# Patient Record
Sex: MALE | Race: WHITE | NOT HISPANIC OR LATINO | Employment: FULL TIME | URBAN - METROPOLITAN AREA
[De-identification: names, ages, dates, MRNs, and addresses within clinical notes are randomized per-mention and may not be internally consistent; named-entity substitution may affect disease eponyms.]

---

## 2023-02-01 PROBLEM — J32.9 CHRONIC SINUSITIS: Status: ACTIVE | Noted: 2023-02-01

## 2023-02-01 PROBLEM — G62.9 PERIPHERAL NEUROPATHY: Status: ACTIVE | Noted: 2023-02-01

## 2023-02-01 PROBLEM — S88.111A BELOW-KNEE AMPUTATION OF RIGHT LOWER EXTREMITY (MULTI): Status: ACTIVE | Noted: 2023-02-01

## 2023-02-01 PROBLEM — I10 HYPERTENSION GOAL BP (BLOOD PRESSURE) < 130/80: Status: ACTIVE | Noted: 2023-02-01

## 2023-02-01 PROBLEM — L03.90 CELLULITIS: Status: ACTIVE | Noted: 2023-02-01

## 2023-02-01 PROBLEM — R05.9 COUGH: Status: RESOLVED | Noted: 2023-02-01 | Resolved: 2023-02-01

## 2023-02-01 PROBLEM — F17.210 CIGARETTE NICOTINE DEPENDENCE WITHOUT COMPLICATION: Status: ACTIVE | Noted: 2023-02-01

## 2023-02-01 PROBLEM — E78.5 HYPERLIPIDEMIA: Status: ACTIVE | Noted: 2023-02-01

## 2023-02-01 PROBLEM — E66.3 OVERWEIGHT: Status: ACTIVE | Noted: 2023-02-01

## 2023-02-01 PROBLEM — L91.8 SKIN TAG: Status: ACTIVE | Noted: 2023-02-01

## 2023-02-01 PROBLEM — R06.00 DYSPNEA: Status: ACTIVE | Noted: 2023-02-01

## 2023-02-01 PROBLEM — R07.9 CHEST PAIN: Status: ACTIVE | Noted: 2023-02-01

## 2023-02-01 RX ORDER — ATORVASTATIN CALCIUM 40 MG/1
TABLET, FILM COATED ORAL
COMMUNITY
End: 2023-10-31 | Stop reason: SDUPTHER

## 2023-02-01 RX ORDER — CARBAMAZEPINE 100 MG/1
TABLET, EXTENDED RELEASE ORAL
COMMUNITY
End: 2023-12-01 | Stop reason: SDUPTHER

## 2023-02-01 RX ORDER — FLUTICASONE PROPIONATE 50 MCG
1 SPRAY, SUSPENSION (ML) NASAL DAILY
COMMUNITY
End: 2023-10-31 | Stop reason: WASHOUT

## 2023-02-01 RX ORDER — AZELASTINE 1 MG/ML
1 SPRAY, METERED NASAL 2 TIMES DAILY
COMMUNITY
End: 2023-10-31 | Stop reason: WASHOUT

## 2023-02-01 RX ORDER — ASPIRIN 81 MG/1
81 TABLET ORAL DAILY
COMMUNITY

## 2023-02-01 RX ORDER — AMLODIPINE BESYLATE 10 MG/1
TABLET ORAL
COMMUNITY
End: 2023-09-18

## 2023-02-01 RX ORDER — IBUPROFEN 200 MG
TABLET ORAL
COMMUNITY
End: 2023-10-31 | Stop reason: WASHOUT

## 2023-02-01 RX ORDER — IPRATROPIUM BROMIDE 21 UG/1
2 SPRAY, METERED NASAL EVERY 12 HOURS
COMMUNITY
End: 2023-10-31 | Stop reason: WASHOUT

## 2023-02-01 RX ORDER — PANTOPRAZOLE SODIUM 40 MG/1
40 TABLET, DELAYED RELEASE ORAL
COMMUNITY

## 2023-02-01 RX ORDER — OLMESARTAN MEDOXOMIL 20 MG/1
20 TABLET ORAL DAILY
COMMUNITY
End: 2023-09-01

## 2023-04-27 ENCOUNTER — OFFICE VISIT (OUTPATIENT)
Dept: PRIMARY CARE | Facility: CLINIC | Age: 53
End: 2023-04-27
Payer: COMMERCIAL

## 2023-04-27 VITALS
HEIGHT: 70 IN | HEART RATE: 87 BPM | DIASTOLIC BLOOD PRESSURE: 71 MMHG | OXYGEN SATURATION: 100 % | TEMPERATURE: 96.5 F | WEIGHT: 232 LBS | BODY MASS INDEX: 33.21 KG/M2 | SYSTOLIC BLOOD PRESSURE: 116 MMHG

## 2023-04-27 DIAGNOSIS — E78.5 HYPERLIPIDEMIA, UNSPECIFIED HYPERLIPIDEMIA TYPE: Primary | ICD-10-CM

## 2023-04-27 DIAGNOSIS — E05.90 HYPERTHYROIDISM, SUBCLINICAL: ICD-10-CM

## 2023-04-27 DIAGNOSIS — M25.562 ACUTE PAIN OF LEFT KNEE: ICD-10-CM

## 2023-04-27 PROCEDURE — 3078F DIAST BP <80 MM HG: CPT | Performed by: STUDENT IN AN ORGANIZED HEALTH CARE EDUCATION/TRAINING PROGRAM

## 2023-04-27 PROCEDURE — 99214 OFFICE O/P EST MOD 30 MIN: CPT | Performed by: STUDENT IN AN ORGANIZED HEALTH CARE EDUCATION/TRAINING PROGRAM

## 2023-04-27 PROCEDURE — 3074F SYST BP LT 130 MM HG: CPT | Performed by: STUDENT IN AN ORGANIZED HEALTH CARE EDUCATION/TRAINING PROGRAM

## 2023-04-27 NOTE — PROGRESS NOTES
"Subjective   Patient ID: Sly Patel is a 53 y.o. male who presents for Hypertension (Fuv from 12/22).  HPI  Pt reports that his left knee hurts and localizes pain to left posterolateral aspect at attachment of biceps femoris on fibula. Describes it as an an ache as a result of compensating for his prosthesis. Pt reports that stairs make the pain worse and resting makes it better.     Pt reports that he is feeling well otherwise. Reports that he eats half a box of fruity kristofer every night for dinner and does not eat any other food throughout the day.     Denies new onset headaches, fever, chills, n/v/d, chest pain, SOB, abdominal pain, urinary symptoms, and lower extremity edema.     Review of Systems  All other systems have been reviewed and are negative.    Visit Vitals  /71   Pulse 87   Temp 35.8 °C (96.5 °F)   Ht 1.778 m (5' 10\")   Wt 105 kg (232 lb)   SpO2 100%   BMI 33.29 kg/m²   Smoking Status Every Day   BSA 2.28 m²       Objective   Physical Exam  General: Alert and oriented. Appears well-nourished and in no acute distress.  Eyes: PERRLA. EOMI.  Head/neck: Normocephalic. Supple.  Lymphatics: No cervical lymphadenopathy.  Respiratory/Thorax: Clear to auscultation bilaterally. No wheezing.   Cardiovascular: Regular rate and rhythm. No murmurs.  Gastrointestinal: Soft, nontender, nondistended. +BS   Musculoskeletal: ROM intact. No joint swelling. Normal strength   Extremities: Warm and well perfused. No peripheral edema. Right, below-knee amputation and wearing prosthesis.   Neurological: No gross neurologic deficits.   Psychological: Appropriate mood and affect.   Skin: No visible rashes or lesions.     Assessment/Plan       No red flags. Follow up in      Problem List Items Addressed This Visit          Other    Hyperlipidemia - Primary    Relevant Orders    Lipid Panel     Other Visit Diagnoses       Hyperthyroidism, subclinical        Relevant Orders    Tsh With Reflex To Free T4 If " Abnormal    Acute pain of left knee        Relevant Orders    Referral to Physical Therapy          I have personally reviewed all available pertinent labs, imaging, and consult notes with the patient.     All questions and concerns were addressed. Patient verbalizes understanding instructions and agrees with established plan of care.     Patient was seen and staffed with attending physician, Dr. Petra Light.     Krystle Tinoco, DO  Family Medicine, PGY-1

## 2023-09-01 DIAGNOSIS — I10 HYPERTENSION GOAL BP (BLOOD PRESSURE) < 130/80: Primary | ICD-10-CM

## 2023-09-01 RX ORDER — OLMESARTAN MEDOXOMIL 20 MG/1
20 TABLET ORAL DAILY
Qty: 90 TABLET | Refills: 0 | Status: SHIPPED | OUTPATIENT
Start: 2023-09-01 | End: 2023-10-31 | Stop reason: SDUPTHER

## 2023-09-15 DIAGNOSIS — E78.5 HYPERLIPIDEMIA, UNSPECIFIED HYPERLIPIDEMIA TYPE: Primary | ICD-10-CM

## 2023-09-15 DIAGNOSIS — I10 HYPERTENSION GOAL BP (BLOOD PRESSURE) < 130/80: ICD-10-CM

## 2023-09-18 RX ORDER — AMLODIPINE BESYLATE 10 MG/1
TABLET ORAL
Qty: 90 TABLET | Refills: 0 | Status: SHIPPED | OUTPATIENT
Start: 2023-09-18 | End: 2023-10-31 | Stop reason: SDUPTHER

## 2023-09-18 RX ORDER — ATORVASTATIN CALCIUM 80 MG/1
80 TABLET, FILM COATED ORAL DAILY
Qty: 90 TABLET | OUTPATIENT
Start: 2023-09-18

## 2023-10-31 ENCOUNTER — OFFICE VISIT (OUTPATIENT)
Dept: PRIMARY CARE | Facility: CLINIC | Age: 53
End: 2023-10-31
Payer: COMMERCIAL

## 2023-10-31 ENCOUNTER — APPOINTMENT (OUTPATIENT)
Dept: PRIMARY CARE | Facility: CLINIC | Age: 53
End: 2023-10-31
Payer: COMMERCIAL

## 2023-10-31 VITALS
HEART RATE: 86 BPM | HEIGHT: 71 IN | WEIGHT: 228 LBS | SYSTOLIC BLOOD PRESSURE: 130 MMHG | BODY MASS INDEX: 31.92 KG/M2 | DIASTOLIC BLOOD PRESSURE: 82 MMHG

## 2023-10-31 DIAGNOSIS — E03.8 SUBCLINICAL HYPOTHYROIDISM: ICD-10-CM

## 2023-10-31 DIAGNOSIS — R79.89 LOW TSH LEVEL: ICD-10-CM

## 2023-10-31 DIAGNOSIS — E05.90 HYPERTHYROIDISM, SUBCLINICAL: ICD-10-CM

## 2023-10-31 DIAGNOSIS — M76.31 IT BAND SYNDROME, RIGHT: ICD-10-CM

## 2023-10-31 DIAGNOSIS — Z30.09 VASECTOMY EVALUATION: ICD-10-CM

## 2023-10-31 DIAGNOSIS — M25.522 PAIN OF BOTH ELBOWS: ICD-10-CM

## 2023-10-31 DIAGNOSIS — I10 HYPERTENSION GOAL BP (BLOOD PRESSURE) < 130/80: ICD-10-CM

## 2023-10-31 DIAGNOSIS — M25.521 PAIN OF BOTH ELBOWS: ICD-10-CM

## 2023-10-31 DIAGNOSIS — R23.3 EASY BRUISING: ICD-10-CM

## 2023-10-31 DIAGNOSIS — E78.5 HYPERLIPIDEMIA, UNSPECIFIED HYPERLIPIDEMIA TYPE: Primary | ICD-10-CM

## 2023-10-31 PROBLEM — L03.90 CELLULITIS: Status: RESOLVED | Noted: 2023-02-01 | Resolved: 2023-10-31

## 2023-10-31 PROBLEM — K21.9 GASTROESOPHAGEAL REFLUX DISEASE: Status: ACTIVE | Noted: 2018-10-30

## 2023-10-31 PROBLEM — R07.9 CHEST PAIN: Status: RESOLVED | Noted: 2023-02-01 | Resolved: 2023-10-31

## 2023-10-31 PROBLEM — N52.9 IMPOTENCE OF ORGANIC ORIGIN: Status: ACTIVE | Noted: 2018-10-30

## 2023-10-31 PROBLEM — R61 HYPERHIDROSIS: Status: ACTIVE | Noted: 2018-10-30

## 2023-10-31 PROBLEM — M51.06 INTERVERTEBRAL DISC DISORDER OF LUMBAR REGION WITH MYELOPATHY: Status: ACTIVE | Noted: 2018-10-30

## 2023-10-31 PROBLEM — R06.00 DYSPNEA: Status: RESOLVED | Noted: 2023-02-01 | Resolved: 2023-10-31

## 2023-10-31 LAB
BASOPHILS # BLD AUTO: 0.05 X10*3/UL (ref 0–0.1)
BASOPHILS NFR BLD AUTO: 0.5 %
CHOLEST SERPL-MCNC: 201 MG/DL (ref 0–199)
CHOLESTEROL/HDL RATIO: 4.4
EOSINOPHIL # BLD AUTO: 0.16 X10*3/UL (ref 0–0.7)
EOSINOPHIL NFR BLD AUTO: 1.7 %
ERYTHROCYTE [DISTWIDTH] IN BLOOD BY AUTOMATED COUNT: 12.5 % (ref 11.5–14.5)
HCT VFR BLD AUTO: 42.5 % (ref 41–52)
HDLC SERPL-MCNC: 46.1 MG/DL
HGB BLD-MCNC: 14.6 G/DL (ref 13.5–17.5)
IMM GRANULOCYTES # BLD AUTO: 0.02 X10*3/UL (ref 0–0.7)
IMM GRANULOCYTES NFR BLD AUTO: 0.2 % (ref 0–0.9)
LDLC SERPL CALC-MCNC: 132 MG/DL
LYMPHOCYTES # BLD AUTO: 3.13 X10*3/UL (ref 1.2–4.8)
LYMPHOCYTES NFR BLD AUTO: 33.9 %
MCH RBC QN AUTO: 33.6 PG (ref 26–34)
MCHC RBC AUTO-ENTMCNC: 34.4 G/DL (ref 32–36)
MCV RBC AUTO: 98 FL (ref 80–100)
MONOCYTES # BLD AUTO: 0.61 X10*3/UL (ref 0.1–1)
MONOCYTES NFR BLD AUTO: 6.6 %
NEUTROPHILS # BLD AUTO: 5.26 X10*3/UL (ref 1.2–7.7)
NEUTROPHILS NFR BLD AUTO: 57.1 %
NON HDL CHOLESTEROL: 155 MG/DL (ref 0–149)
NRBC BLD-RTO: 0 /100 WBCS (ref 0–0)
PLATELET # BLD AUTO: 204 X10*3/UL (ref 150–450)
PMV BLD AUTO: 10.6 FL (ref 7.5–11.5)
RBC # BLD AUTO: 4.35 X10*6/UL (ref 4.5–5.9)
TRIGL SERPL-MCNC: 116 MG/DL (ref 0–149)
TSH SERPL-ACNC: 1.45 MIU/L (ref 0.44–3.98)
VLDL: 23 MG/DL (ref 0–40)
WBC # BLD AUTO: 9.2 X10*3/UL (ref 4.4–11.3)

## 2023-10-31 PROCEDURE — 3079F DIAST BP 80-89 MM HG: CPT | Performed by: NURSE PRACTITIONER

## 2023-10-31 PROCEDURE — 3075F SYST BP GE 130 - 139MM HG: CPT | Performed by: NURSE PRACTITIONER

## 2023-10-31 PROCEDURE — 36415 COLL VENOUS BLD VENIPUNCTURE: CPT

## 2023-10-31 PROCEDURE — 84443 ASSAY THYROID STIM HORMONE: CPT

## 2023-10-31 PROCEDURE — 85025 COMPLETE CBC W/AUTO DIFF WBC: CPT

## 2023-10-31 PROCEDURE — 83036 HEMOGLOBIN GLYCOSYLATED A1C: CPT

## 2023-10-31 PROCEDURE — 80061 LIPID PANEL: CPT

## 2023-10-31 PROCEDURE — 99214 OFFICE O/P EST MOD 30 MIN: CPT | Performed by: NURSE PRACTITIONER

## 2023-10-31 RX ORDER — OLMESARTAN MEDOXOMIL 20 MG/1
20 TABLET ORAL DAILY
Qty: 90 TABLET | Refills: 3 | Status: SHIPPED | OUTPATIENT
Start: 2023-10-31 | End: 2023-12-01 | Stop reason: SDUPTHER

## 2023-10-31 RX ORDER — ATORVASTATIN CALCIUM 40 MG/1
40 TABLET, FILM COATED ORAL DAILY
Qty: 90 TABLET | Refills: 3 | Status: SHIPPED | OUTPATIENT
Start: 2023-10-31

## 2023-10-31 RX ORDER — AMLODIPINE BESYLATE 10 MG/1
10 TABLET ORAL DAILY
Qty: 90 TABLET | Refills: 3 | Status: SHIPPED | OUTPATIENT
Start: 2023-10-31 | End: 2023-12-26 | Stop reason: SDUPTHER

## 2023-10-31 NOTE — PROGRESS NOTES
"Subjective   Patient ID: Sly Patel is a 53 y.o. male who presents for chronic care.    HPI     Reports BL elbow pain to both lateral and medial aspects that have been present for a couple years. Denies any injury or trauma, but believes its from bench pressing years ago. Has some days he has no pain. Has tried ibuprofen and worn braces without much relief.     Has noticed increased bruising over the last 6-8 months. He will have bruises but not know where they're coming from. Does not take any blood thinners or ASA.     Reports pain to his R IT band. His prostethetic Dr adjusted his prosthesis but he has not had much improvement. Squatting seems to make the pain worse.     Would like a referral to urology for vasectomy evaluation.     Hypertension/Hyperlipidemia  Current meds: amlodipine 10 mg every day, olmesartan 20 mg every day, atorvastatin 40 mg every day   Home blood pressure monitoring: none  Salt intake: does not limit  Caffeine intake:3 pots of coffee per day  Diet: unhealthy   Exercise: 5-6 days per week   Alcohol use: none  Tobacco use: 1-2 packs cigarettes per day  Illicit drug use: none    Denies vision changes, headaches, dizziness, chest pain, palpitations, or edema.    GERD  Well controlled with pantoprazole. Room temperature coffee makes it worse.     Review of Systems  ROS negative except as noted above in HPI.       Objective   /82   Pulse 86   Ht 1.803 m (5' 11\")   Wt 103 kg (228 lb)   BMI 31.80 kg/m²     Physical Exam  General: Alert and oriented, in no acute distress. Appears stated age, well-nourished, and well hydrated  HEENT:  - Head: Normocephalic and atraumatic   - Eyes: EOMI, PERRLA  - ENT: Hearing grossly intact  Heart: RRR. No murmurs, clicks, or rubs  Lungs: Unlabored breathing. CTAB with no crackles, wheezes, or rhonchi  Abdomen: Normal BS in all 4 quadrants. Soft, non-tender, non-distended, with no masses  Extremities: Warm and well perfused. No edema. Normal " peripheral pulses. R BKA  Musculoskeletal: Points to BL medial and lateral epicondyle to show where pain is. Normal ROM. No TTP of R IT band. Normal gait and station  Neurological: Alert and oriented. No gross neurological deficits  Psychological: Appropriate mood and affect  Skin: No rash, abnormal lesions, cyanosis, or erythema      Assessment/Plan   BL elbow pain  - Likely medial and lateral epicondyle  - Follow up with Dr. Garza for possible injection    Easy bruising  - Check CBCd    IT band pain  - Recommend RICE therapy  - Follow up with Dr. Garza as above    Vasectomy evaluation  - Referral placed for urology     Hypertension, goal <130/80  - Diastolic borderline, will continue current meds  - Continue amlodipine 10 mg every day, olmesartan 40 mg every day  - Monitor BP at home  - Lifestyle management  - Check HgA1c    Hyperlipidemia  - Continue atovastatin 40 mg every day, recommend restarting ASA 81 mg every day  - Check lipid panel    Nicotine dependence  - Recommend smoking cessation, not ready to quit    GERD  - Well controlled   - Continue pantoprazole 40 mg every day    Subclinical hyperthyroidism  - Check TSH     RTC in 6 months or sooner PETROS Hurt-CNP  [facility]

## 2023-11-01 LAB
EST. AVERAGE GLUCOSE BLD GHB EST-MCNC: 100 MG/DL
HBA1C MFR BLD: 5.1 %

## 2023-11-10 NOTE — RESULT ENCOUNTER NOTE
Patient notified. Counseled on dietary changes. He was out of atorvastatin for 3 months so likely why it was elevated. Will recheck lipid panel at next appt.

## 2023-11-21 ENCOUNTER — OFFICE VISIT (OUTPATIENT)
Dept: PRIMARY CARE | Facility: CLINIC | Age: 53
End: 2023-11-21
Payer: COMMERCIAL

## 2023-11-21 VITALS — BODY MASS INDEX: 32.78 KG/M2 | WEIGHT: 229 LBS | HEIGHT: 70 IN

## 2023-11-21 DIAGNOSIS — M77.11 LATERAL EPICONDYLITIS OF BOTH ELBOWS: ICD-10-CM

## 2023-11-21 DIAGNOSIS — R53.83 OTHER FATIGUE: Primary | ICD-10-CM

## 2023-11-21 DIAGNOSIS — S76.301A RIGHT HAMSTRING INJURY, INITIAL ENCOUNTER: ICD-10-CM

## 2023-11-21 DIAGNOSIS — M77.12 LATERAL EPICONDYLITIS OF BOTH ELBOWS: ICD-10-CM

## 2023-11-21 PROCEDURE — 20605 DRAIN/INJ JOINT/BURSA W/O US: CPT | Performed by: FAMILY MEDICINE

## 2023-11-21 PROCEDURE — 99214 OFFICE O/P EST MOD 30 MIN: CPT | Performed by: FAMILY MEDICINE

## 2023-11-21 RX ORDER — FOAM BANDAGE 3" X 3"
1 BANDAGE TOPICAL DAILY
COMMUNITY
Start: 2018-03-08 | End: 2024-03-04 | Stop reason: ALTCHOICE

## 2023-11-21 RX ORDER — TRIAMCINOLONE ACETONIDE 40 MG/ML
20 INJECTION, SUSPENSION INTRA-ARTICULAR; INTRAMUSCULAR
Status: COMPLETED | OUTPATIENT
Start: 2023-11-21 | End: 2023-11-21

## 2023-11-21 RX ORDER — LIDOCAINE HYDROCHLORIDE 10 MG/ML
0.5 INJECTION INFILTRATION; PERINEURAL
Status: COMPLETED | OUTPATIENT
Start: 2023-11-21 | End: 2023-11-21

## 2023-11-21 RX ADMIN — LIDOCAINE HYDROCHLORIDE 0.5 ML: 10 INJECTION INFILTRATION; PERINEURAL at 16:40

## 2023-11-21 RX ADMIN — TRIAMCINOLONE ACETONIDE 20 MG: 40 INJECTION, SUSPENSION INTRA-ARTICULAR; INTRAMUSCULAR at 16:37

## 2023-11-21 RX ADMIN — LIDOCAINE HYDROCHLORIDE 0.5 ML: 10 INJECTION INFILTRATION; PERINEURAL at 16:37

## 2023-11-21 RX ADMIN — TRIAMCINOLONE ACETONIDE 20 MG: 40 INJECTION, SUSPENSION INTRA-ARTICULAR; INTRAMUSCULAR at 16:40

## 2023-11-21 NOTE — PROGRESS NOTES
Chief Complaint:   No chief complaint on file.     History Of Present Illness:    Sly Patel is a 53 y.o. male presenting for an office visit.    Bilateral elbow pain.  He locates the pain overlying his medial epicondyle and lateral epicondyle.  He states that he has bilateral lateral epicondyles are bothersome for him today.  Different motions bother him.  He has decreased the amount of weight that he lifts when he works out.  He has seen other providers about this and is here for further evaluation.  He has tried straps and braces that are not helpful.  He is right-handed.    Right lateral knee: He does wear a prosthetic for a below the knee amputation, and states that he has had his prosthetic adjusted over time.     Fatigue: he has more fatigue and he has dec energy- we agreed to check testosterone blood work.        Review of Systems:    Negative  Constitutional: fever, chills, night sweats, unexpected weight change, changes in sleep  Eyes: loss of vision, double vision, floaters  Ear/Nose/Throat/Mouth: sore throat, hearing changes, sinus congestion  Cardiovascular: chest pain, chest heaviness, palpitations, swelling in ankles  Psychological: feelings of depression, feelings of anxiety.  Hematologic/Lymphatic: swollen glands, blood clotting problems, easy bruising.   Respiratory: shortness of breath, difficulty breathing, frequent cough, wheezing  Neurological: loss of consciousness, tremors, dizzy spells, numbness, tingling.  Gastrointestinal: abdominal pain, nausea, vomiting, indigestion, heartburn, sour taste in throat when waking up, constipation, diarrhea, bloody stools, loss of bowel control  Genitourinary: urinary incontinence, increased urinary frequency, painful urination, blood in urine  Skin: Rash, lumps or bumps, worrisome moles  Sexual: sexual health concerns      Positive  Psychological: feeling generally happy, feeling safe at home      Last Recorded Vitals:  Vitals:    11/21/23 1525  "  Weight: 104 kg (229 lb)   Height: 1.778 m (5' 10\")       Past Medical History:  He has a past medical history of Cough (02/01/2023).    Past Surgical History:  He has no past surgical history on file.      Social History:  He reports that he has been smoking cigarettes. He has never used smokeless tobacco. He reports that he does not drink alcohol and does not use drugs.    Family History:  No family history on file.     Allergies:  Penicillins    Outpatient Medications:  Current Outpatient Medications   Medication Instructions    amLODIPine (NORVASC) 10 mg, oral, Daily    aspirin 81 mg, oral, Daily    atorvastatin (LIPITOR) 40 mg, oral, Daily, as directed    carBAMazepine XR (TEGretol XR) 100 mg 12 hr tablet TAKE 1 TABLET EVERY 12 HOURS DAILY.    olmesartan (BENICAR) 20 mg, oral, Daily    pantoprazole (PROTONIX) 40 mg, oral, Daily before breakfast, Do not crush, chew, or split.     silver-hydrocolloid dressing (Aquacel AG Burn) 1.2-4 X 5 %-\" bandage 1 application., Topical, Daily       Physical Exam:  GENERAL: Well developed, well nourished, alert and cooperative, and appears to be in no acute distress.  PSYCH: mood pleasant and appropriate  HEAD: normocephalic  EYES: PERRL, EOMI. vision is grossly intact.   GAIT: Normal  MUSCULOSKELETAL:   B/l elbow: lateral epicondyle ttp, no laxity w UCL testing b/l. Good strength and ROM.  Right knee: distal lateral hamstring ttp near insertion on fibular head. IT band was nontender.      Last Labs:  CBC -  Lab Results   Component Value Date    WBC 9.2 10/31/2023    HGB 14.6 10/31/2023    HCT 42.5 10/31/2023    MCV 98 10/31/2023     10/31/2023       CMP -  Lab Results   Component Value Date    CALCIUM 9.6 01/10/2023    PROT 7.0 01/10/2023    ALBUMIN 4.4 01/10/2023    AST 22 01/10/2023    ALT 32 01/10/2023    ALKPHOS 80 01/10/2023    BILITOT 0.4 01/10/2023       LIPID PANEL -   Lab Results   Component Value Date    CHOL 201 (H) 10/31/2023    TRIG 116 10/31/2023    HDL " 46.1 10/31/2023    CHHDL 4.4 10/31/2023    LDLF 206 (H) 12/06/2022    VLDL 23 10/31/2023    NHDL 155 (H) 10/31/2023         Lab Results   Component Value Date    HGBA1C 5.1 10/31/2023       Joint Injection Medium/Arthrocentesis: R elbow on 11/21/2023 4:37 PM  Indications: pain  Details: 22 G needle, lateral approach  Medications: 20 mg triamcinolone acetonide 40 mg/mL; 0.5 mL lidocaine 10 mg/mL (1 %)  Outcome: tolerated well, no immediate complications  Procedure, treatment alternatives, risks and benefits explained, specific risks discussed. Consent was given by the patient. Immediately prior to procedure a time out was called to verify the correct patient, procedure, equipment, support staff and site/side marked as required. Patient was prepped and draped in the usual sterile fashion.       Joint Injection Medium/Arthrocentesis: L elbow on 11/21/2023 4:40 PM  Indications: pain  Details: 22 G needle, lateral approach  Medications: 20 mg triamcinolone acetonide 40 mg/mL; 0.5 mL lidocaine 10 mg/mL (1 %)  Outcome: tolerated well, no immediate complications  Procedure, treatment alternatives, risks and benefits explained, specific risks discussed. Consent was given by the patient. Immediately prior to procedure a time out was called to verify the correct patient, procedure, equipment, support staff and site/side marked as required. Patient was prepped and draped in the usual sterile fashion.                   Assessment/Plan   Problem List Items Addressed This Visit             ICD-10-CM    Lateral epicondylitis of both elbows M77.11, M77.12    Other fatigue - Primary R53.83    Right hamstring injury S76.301A     Bilateral elbow injection.  Expectant management discussed.    Right lateral leg, this seems to be hamstring related, recommend physical therapy and blood flow restriction training.    Fatigue, agreed to check testosterone level.    We will follow-up once we get the testosterone results.      Thang ERICKSON  Greg, DO

## 2023-11-29 ENCOUNTER — LAB (OUTPATIENT)
Dept: LAB | Facility: LAB | Age: 53
End: 2023-11-29
Payer: COMMERCIAL

## 2023-11-29 DIAGNOSIS — R53.83 OTHER FATIGUE: ICD-10-CM

## 2023-11-29 PROCEDURE — 84402 ASSAY OF FREE TESTOSTERONE: CPT

## 2023-11-29 PROCEDURE — 36415 COLL VENOUS BLD VENIPUNCTURE: CPT

## 2023-12-01 ENCOUNTER — TELEPHONE (OUTPATIENT)
Dept: PRIMARY CARE | Facility: CLINIC | Age: 53
End: 2023-12-01
Payer: COMMERCIAL

## 2023-12-01 DIAGNOSIS — I10 HYPERTENSION GOAL BP (BLOOD PRESSURE) < 130/80: ICD-10-CM

## 2023-12-01 DIAGNOSIS — G62.9 PERIPHERAL POLYNEUROPATHY: Primary | ICD-10-CM

## 2023-12-01 RX ORDER — CARBAMAZEPINE 100 MG/1
100 TABLET, EXTENDED RELEASE ORAL EVERY 12 HOURS
Qty: 180 TABLET | Refills: 1 | Status: SHIPPED | OUTPATIENT
Start: 2023-12-01 | End: 2024-04-25 | Stop reason: SDUPTHER

## 2023-12-01 RX ORDER — OLMESARTAN MEDOXOMIL 20 MG/1
20 TABLET ORAL DAILY
Qty: 90 TABLET | Refills: 1 | Status: SHIPPED | OUTPATIENT
Start: 2023-12-01 | End: 2024-03-04 | Stop reason: ALTCHOICE

## 2023-12-03 LAB
TESTOSTERONE FREE (CHAN): 38.6 PG/ML (ref 35–155)
TESTOSTERONE,TOTAL,LC-MS/MS: 420 NG/DL (ref 250–1100)

## 2023-12-05 NOTE — TELEPHONE ENCOUNTER
Pt returned your call    No refills needed now.    Discussed testosterone level and agreed to monitor for now.    Injections helpful, consider bracing for tennis elbow.     Follow up as needed.

## 2023-12-06 ENCOUNTER — TELEPHONE (OUTPATIENT)
Dept: PRIMARY CARE | Facility: CLINIC | Age: 53
End: 2023-12-06

## 2023-12-12 ENCOUNTER — APPOINTMENT (OUTPATIENT)
Dept: PHYSICAL THERAPY | Facility: CLINIC | Age: 53
End: 2023-12-12
Payer: COMMERCIAL

## 2023-12-26 DIAGNOSIS — I10 HYPERTENSION GOAL BP (BLOOD PRESSURE) < 130/80: ICD-10-CM

## 2023-12-26 RX ORDER — AMLODIPINE BESYLATE 10 MG/1
10 TABLET ORAL DAILY
Qty: 90 TABLET | Refills: 3 | Status: SHIPPED | OUTPATIENT
Start: 2023-12-26

## 2024-01-17 ENCOUNTER — EVALUATION (OUTPATIENT)
Dept: PHYSICAL THERAPY | Facility: CLINIC | Age: 54
End: 2024-01-17
Payer: COMMERCIAL

## 2024-01-17 DIAGNOSIS — G89.29 CHRONIC PAIN OF LEFT KNEE: Primary | ICD-10-CM

## 2024-01-17 DIAGNOSIS — S76.301A RIGHT HAMSTRING INJURY, INITIAL ENCOUNTER: ICD-10-CM

## 2024-01-17 DIAGNOSIS — M25.562 CHRONIC PAIN OF LEFT KNEE: Primary | ICD-10-CM

## 2024-01-17 PROCEDURE — 97161 PT EVAL LOW COMPLEX 20 MIN: CPT | Mod: GP | Performed by: PHYSICAL THERAPIST

## 2024-01-17 PROCEDURE — 97535 SELF CARE MNGMENT TRAINING: CPT | Mod: GP | Performed by: PHYSICAL THERAPIST
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
## 2024-01-18 ASSESSMENT — ENCOUNTER SYMPTOMS
OCCASIONAL FEELINGS OF UNSTEADINESS: 0
DEPRESSION: 0
LOSS OF SENSATION IN FEET: 0

## 2024-01-29 ENCOUNTER — TELEPHONE (OUTPATIENT)
Dept: PRIMARY CARE | Facility: CLINIC | Age: 54
End: 2024-01-29
Payer: COMMERCIAL

## 2024-01-29 NOTE — TELEPHONE ENCOUNTER
Patient called in asked to schedule new patient apt, referral from Dr. Mayda fried to schedule with availability or move up, Advise?

## 2024-01-31 ENCOUNTER — APPOINTMENT (OUTPATIENT)
Dept: PHYSICAL THERAPY | Facility: CLINIC | Age: 54
End: 2024-01-31
Payer: COMMERCIAL

## 2024-02-02 ENCOUNTER — APPOINTMENT (OUTPATIENT)
Dept: PHYSICAL THERAPY | Facility: CLINIC | Age: 54
End: 2024-02-02
Payer: COMMERCIAL

## 2024-02-05 ENCOUNTER — APPOINTMENT (OUTPATIENT)
Dept: PHYSICAL THERAPY | Facility: CLINIC | Age: 54
End: 2024-02-05
Payer: COMMERCIAL

## 2024-02-07 ENCOUNTER — APPOINTMENT (OUTPATIENT)
Dept: PHYSICAL THERAPY | Facility: CLINIC | Age: 54
End: 2024-02-07
Payer: COMMERCIAL

## 2024-02-12 ENCOUNTER — APPOINTMENT (OUTPATIENT)
Dept: PHYSICAL THERAPY | Facility: CLINIC | Age: 54
End: 2024-02-12
Payer: COMMERCIAL

## 2024-02-14 ENCOUNTER — APPOINTMENT (OUTPATIENT)
Dept: PHYSICAL THERAPY | Facility: CLINIC | Age: 54
End: 2024-02-14
Payer: COMMERCIAL

## 2024-02-19 ENCOUNTER — APPOINTMENT (OUTPATIENT)
Dept: PHYSICAL THERAPY | Facility: CLINIC | Age: 54
End: 2024-02-19
Payer: COMMERCIAL

## 2024-02-21 ENCOUNTER — APPOINTMENT (OUTPATIENT)
Dept: PHYSICAL THERAPY | Facility: CLINIC | Age: 54
End: 2024-02-21
Payer: COMMERCIAL

## 2024-03-04 ENCOUNTER — OFFICE VISIT (OUTPATIENT)
Dept: PRIMARY CARE | Facility: CLINIC | Age: 54
End: 2024-03-04
Payer: COMMERCIAL

## 2024-03-04 VITALS
HEART RATE: 93 BPM | OXYGEN SATURATION: 98 % | TEMPERATURE: 98 F | BODY MASS INDEX: 31.87 KG/M2 | RESPIRATION RATE: 16 BRPM | HEIGHT: 69 IN | DIASTOLIC BLOOD PRESSURE: 76 MMHG | WEIGHT: 215.2 LBS | SYSTOLIC BLOOD PRESSURE: 144 MMHG

## 2024-03-04 DIAGNOSIS — M54.16 LUMBAR RADICULOPATHY: Primary | ICD-10-CM

## 2024-03-04 DIAGNOSIS — Z12.11 ENCOUNTER FOR SCREENING FOR MALIGNANT NEOPLASM OF COLON: ICD-10-CM

## 2024-03-04 DIAGNOSIS — I10 HYPERTENSION, UNSPECIFIED TYPE: ICD-10-CM

## 2024-03-04 DIAGNOSIS — Z00.00 ANNUAL PHYSICAL EXAM: ICD-10-CM

## 2024-03-04 DIAGNOSIS — Z72.0 TOBACCO ABUSE: ICD-10-CM

## 2024-03-04 DIAGNOSIS — S88.111A BELOW-KNEE AMPUTATION OF RIGHT LOWER EXTREMITY (MULTI): ICD-10-CM

## 2024-03-04 DIAGNOSIS — E78.5 HYPERLIPIDEMIA, UNSPECIFIED HYPERLIPIDEMIA TYPE: ICD-10-CM

## 2024-03-04 PROCEDURE — 99214 OFFICE O/P EST MOD 30 MIN: CPT | Performed by: FAMILY MEDICINE

## 2024-03-04 PROCEDURE — 3077F SYST BP >= 140 MM HG: CPT | Performed by: FAMILY MEDICINE

## 2024-03-04 PROCEDURE — 3078F DIAST BP <80 MM HG: CPT | Performed by: FAMILY MEDICINE

## 2024-03-04 RX ORDER — OLMESARTAN MEDOXOMIL 40 MG/1
40 TABLET ORAL DAILY
Qty: 30 TABLET | Refills: 11 | Status: SHIPPED | OUTPATIENT
Start: 2024-03-04 | End: 2025-03-04

## 2024-03-04 RX ORDER — METHYLPREDNISOLONE 4 MG/1
TABLET ORAL
Qty: 21 TABLET | Refills: 0 | Status: SHIPPED | OUTPATIENT
Start: 2024-03-04 | End: 2024-03-11

## 2024-03-04 RX ORDER — CLINDAMYCIN PHOSPHATE 10 UG/ML
LOTION TOPICAL AS NEEDED
COMMUNITY
Start: 2023-12-01

## 2024-03-04 ASSESSMENT — ANXIETY QUESTIONNAIRES
4. TROUBLE RELAXING: NOT AT ALL
6. BECOMING EASILY ANNOYED OR IRRITABLE: NOT AT ALL
7. FEELING AFRAID AS IF SOMETHING AWFUL MIGHT HAPPEN: NOT AT ALL
1. FEELING NERVOUS, ANXIOUS, OR ON EDGE: NOT AT ALL
3. WORRYING TOO MUCH ABOUT DIFFERENT THINGS: NOT AT ALL
IF YOU CHECKED OFF ANY PROBLEMS ON THIS QUESTIONNAIRE, HOW DIFFICULT HAVE THESE PROBLEMS MADE IT FOR YOU TO DO YOUR WORK, TAKE CARE OF THINGS AT HOME, OR GET ALONG WITH OTHER PEOPLE: NOT DIFFICULT AT ALL
2. NOT BEING ABLE TO STOP OR CONTROL WORRYING: NOT AT ALL
GAD7 TOTAL SCORE: 0
5. BEING SO RESTLESS THAT IT IS HARD TO SIT STILL: NOT AT ALL

## 2024-03-04 ASSESSMENT — ENCOUNTER SYMPTOMS
DEPRESSION: 0
LOSS OF SENSATION IN FEET: 0
OCCASIONAL FEELINGS OF UNSTEADINESS: 0
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
## 2024-03-11 ENCOUNTER — APPOINTMENT (OUTPATIENT)
Dept: PHYSICAL THERAPY | Facility: CLINIC | Age: 54
End: 2024-03-11
Payer: COMMERCIAL

## 2024-03-13 ENCOUNTER — APPOINTMENT (OUTPATIENT)
Dept: PHYSICAL THERAPY | Facility: CLINIC | Age: 54
End: 2024-03-13
Payer: COMMERCIAL

## 2024-04-25 DIAGNOSIS — G62.9 PERIPHERAL POLYNEUROPATHY: ICD-10-CM

## 2024-04-25 RX ORDER — CARBAMAZEPINE 100 MG/1
100 TABLET, EXTENDED RELEASE ORAL EVERY 12 HOURS
Qty: 180 TABLET | Refills: 1 | Status: SHIPPED | OUTPATIENT
Start: 2024-04-25

## 2024-05-12 LAB — NONINV COLON CA DNA+OCC BLD SCRN STL QL: NEGATIVE

## 2024-05-13 ENCOUNTER — TELEPHONE (OUTPATIENT)
Dept: PRIMARY CARE | Facility: CLINIC | Age: 54
End: 2024-05-13
Payer: COMMERCIAL

## 2024-05-13 NOTE — TELEPHONE ENCOUNTER
Patient stopped in needing a prosthetic sleeve for his right leg below the knee, Advise? [employer] will be faxing over the order, Advise?

## 2024-05-14 ENCOUNTER — LAB (OUTPATIENT)
Dept: LAB | Facility: LAB | Age: 54
End: 2024-05-14
Payer: COMMERCIAL

## 2024-05-14 DIAGNOSIS — Z00.00 ANNUAL PHYSICAL EXAM: ICD-10-CM

## 2024-05-14 LAB
ALBUMIN SERPL BCP-MCNC: 4.1 G/DL (ref 3.4–5)
ALP SERPL-CCNC: 71 U/L (ref 33–120)
ALT SERPL W P-5'-P-CCNC: 248 U/L (ref 10–52)
ANION GAP SERPL CALC-SCNC: 14 MMOL/L (ref 10–20)
AST SERPL W P-5'-P-CCNC: 118 U/L (ref 9–39)
BASOPHILS # BLD AUTO: 0.06 X10*3/UL (ref 0–0.1)
BASOPHILS NFR BLD AUTO: 0.6 %
BILIRUB SERPL-MCNC: 0.7 MG/DL (ref 0–1.2)
BUN SERPL-MCNC: 20 MG/DL (ref 6–23)
CALCIUM SERPL-MCNC: 9.3 MG/DL (ref 8.6–10.6)
CHLORIDE SERPL-SCNC: 104 MMOL/L (ref 98–107)
CHOLEST SERPL-MCNC: 143 MG/DL (ref 0–199)
CHOLESTEROL/HDL RATIO: 3.1
CO2 SERPL-SCNC: 24 MMOL/L (ref 21–32)
CREAT SERPL-MCNC: 1.1 MG/DL (ref 0.5–1.3)
EGFRCR SERPLBLD CKD-EPI 2021: 80 ML/MIN/1.73M*2
EOSINOPHIL # BLD AUTO: 0.12 X10*3/UL (ref 0–0.7)
EOSINOPHIL NFR BLD AUTO: 1.1 %
ERYTHROCYTE [DISTWIDTH] IN BLOOD BY AUTOMATED COUNT: 12.2 % (ref 11.5–14.5)
EST. AVERAGE GLUCOSE BLD GHB EST-MCNC: 100 MG/DL
GLUCOSE SERPL-MCNC: 107 MG/DL (ref 74–99)
HBA1C MFR BLD: 5.1 %
HCT VFR BLD AUTO: 40.3 % (ref 41–52)
HDLC SERPL-MCNC: 46 MG/DL
HGB BLD-MCNC: 13.8 G/DL (ref 13.5–17.5)
IMM GRANULOCYTES # BLD AUTO: 0.03 X10*3/UL (ref 0–0.7)
IMM GRANULOCYTES NFR BLD AUTO: 0.3 % (ref 0–0.9)
LDLC SERPL CALC-MCNC: 83 MG/DL
LYMPHOCYTES # BLD AUTO: 2.76 X10*3/UL (ref 1.2–4.8)
LYMPHOCYTES NFR BLD AUTO: 25.6 %
MCH RBC QN AUTO: 32.5 PG (ref 26–34)
MCHC RBC AUTO-ENTMCNC: 34.2 G/DL (ref 32–36)
MCV RBC AUTO: 95 FL (ref 80–100)
MONOCYTES # BLD AUTO: 0.7 X10*3/UL (ref 0.1–1)
MONOCYTES NFR BLD AUTO: 6.5 %
NEUTROPHILS # BLD AUTO: 7.13 X10*3/UL (ref 1.2–7.7)
NEUTROPHILS NFR BLD AUTO: 65.9 %
NON HDL CHOLESTEROL: 97 MG/DL (ref 0–149)
NRBC BLD-RTO: 0 /100 WBCS (ref 0–0)
PLATELET # BLD AUTO: 174 X10*3/UL (ref 150–450)
POTASSIUM SERPL-SCNC: 4.2 MMOL/L (ref 3.5–5.3)
PROT SERPL-MCNC: 6.9 G/DL (ref 6.4–8.2)
PSA SERPL-MCNC: 0.93 NG/ML
RBC # BLD AUTO: 4.24 X10*6/UL (ref 4.5–5.9)
SODIUM SERPL-SCNC: 138 MMOL/L (ref 136–145)
TRIGL SERPL-MCNC: 72 MG/DL (ref 0–149)
TSH SERPL-ACNC: 0.91 MIU/L (ref 0.44–3.98)
VLDL: 14 MG/DL (ref 0–40)
WBC # BLD AUTO: 10.8 X10*3/UL (ref 4.4–11.3)

## 2024-05-14 PROCEDURE — 83036 HEMOGLOBIN GLYCOSYLATED A1C: CPT

## 2024-05-14 PROCEDURE — 84443 ASSAY THYROID STIM HORMONE: CPT

## 2024-05-14 PROCEDURE — 80061 LIPID PANEL: CPT

## 2024-05-14 PROCEDURE — 80053 COMPREHEN METABOLIC PANEL: CPT

## 2024-05-14 PROCEDURE — 84153 ASSAY OF PSA TOTAL: CPT

## 2024-05-14 PROCEDURE — 36415 COLL VENOUS BLD VENIPUNCTURE: CPT

## 2024-05-14 PROCEDURE — 85025 COMPLETE CBC W/AUTO DIFF WBC: CPT

## 2024-05-16 ENCOUNTER — APPOINTMENT (OUTPATIENT)
Dept: RADIOLOGY | Facility: CLINIC | Age: 54
End: 2024-05-16
Payer: COMMERCIAL

## 2024-05-16 ENCOUNTER — TELEMEDICINE (OUTPATIENT)
Dept: PRIMARY CARE | Facility: CLINIC | Age: 54
End: 2024-05-16
Payer: COMMERCIAL

## 2024-05-16 DIAGNOSIS — R79.89 ELEVATED LFTS: Primary | ICD-10-CM

## 2024-05-16 DIAGNOSIS — M76.62 LEFT ACHILLES TENDINITIS: ICD-10-CM

## 2024-05-16 DIAGNOSIS — E78.5 HYPERLIPIDEMIA, UNSPECIFIED HYPERLIPIDEMIA TYPE: ICD-10-CM

## 2024-05-16 DIAGNOSIS — G60.9 IDIOPATHIC PERIPHERAL NEUROPATHY: ICD-10-CM

## 2024-05-16 DIAGNOSIS — M54.16 LUMBAR RADICULOPATHY: ICD-10-CM

## 2024-05-16 DIAGNOSIS — I10 HYPERTENSION, UNSPECIFIED TYPE: ICD-10-CM

## 2024-05-16 PROCEDURE — 99215 OFFICE O/P EST HI 40 MIN: CPT | Performed by: FAMILY MEDICINE

## 2024-05-16 RX ORDER — METHYLPREDNISOLONE 4 MG/1
TABLET ORAL
Qty: 21 TABLET | Refills: 0 | Status: SHIPPED | OUTPATIENT
Start: 2024-05-16

## 2024-05-16 NOTE — TELEPHONE ENCOUNTER
Left vm for patient regarding CB for 1-2 month FUV with BB and clarification on prosthetic order.

## 2024-05-16 NOTE — PROGRESS NOTES
Subjective   Sly Patel is a 54 y.o. male who presents for No chief complaint on file..  HPI    LFTs very elevated. Has not drank in over 20 yrs. Does not feel any different. No abd pain, fever, NV.     Needs new Rx for prosthetic sleeve    Almost quit smoking but has been smoking.     Took steroids for lumbar radicular pain helped some.     L calf into heel pain, can't walk. Hx R BKA.     An interactive audio and video telecommunication system which permits real time communications between the patient  (at the originating site) and provider (at the distant site) was utilized to provide this telehealth service. Verbal consent was requested and obtained today for a telehealth visit.  Current Outpatient Medications on File Prior to Visit   Medication Sig Dispense Refill    amLODIPine (Norvasc) 10 mg tablet TAKE 1 TABLET BY MOUTH EVERY DAY 90 tablet 3    aspirin 81 mg EC tablet Take 1 tablet (81 mg) by mouth once daily.      atorvastatin (Lipitor) 40 mg tablet Take 1 tablet (40 mg) by mouth once daily. as directed 90 tablet 3    carBAMazepine XR (TEGretol XR) 100 mg 12 hr tablet Take 1 tablet (100 mg) by mouth every 12 hours. Do not crush, chew, or split. 180 tablet 1    clindamycin (Cleocin T) 1 % lotion Apply topically if needed.      olmesartan (BENIcar) 40 mg tablet Take 1 tablet (40 mg) by mouth once daily. 30 tablet 11    pantoprazole (ProtoNix) 40 mg EC tablet Take 1 tablet (40 mg) by mouth once daily in the morning. Take before meals. Do not crush, chew, or split.       No current facility-administered medications on file prior to visit.                  Objective   There were no vitals taken for this visit.   Physical Exam  General: NAD  HEENT:NCAT  Lungs: no audible wheeze or rhonchi   Skin: no rashes  Psych: cooperative, appropriate affect  Neuro: speech clear. A&Ox3  MSK: pain along L gastroc into achilles       Assessment/Plan   Problem List Items Addressed This Visit       Hyperlipidemia  Lipids  at goal   HOLD statin d/t unknown cause of elevated LFT for now      Peripheral neuropathy  Controlled w tegeterol   DEC tegretol from 100 BID to daily d/t elevated LFTs w unknown cause        Other Visit Diagnoses       Elevated LFTs    -  Primary  LFTs 4x nml limits  Will start w/up w labs/stat liver US  No alcohol or tylenol   Holding statin/tegretol d/t poss med cause  RF hep   Rpt LFTs tomorrow   Of note, asym       Relevant Orders    US abdomen limited liver    Alpha-1-Antitrypsin    Alpha-Fetoprotein    NIMISHA with Reflex to ADELE    Anti-Mitochondrial Antibody    Anti-Smooth Muscle Antibody    Ceruloplasmin    Ferritin    Hepatitis Panel, Acute    Hepatitis C Antibody    Iron and TIBC    Protime-INR    Hepatic Function Panel    Referral to Hepatology    Hypertension, unspecified type      Recheck in office in 3 wk   Cont benicar 40       Lumbar radiculopathy      Improved about 50% w medrol in march   Will do another round of steroids but did discuss that long term/freq use of steroids has risks  He was RF to pain mgnt in march bt held off d/t concern w poss meds they would put him on.   I discussed pain mgnt RF was for injections which are proven to be effective.   He will justyna w pain mgnt if steroids do not improve his pain       Relevant Medications    methylPREDNISolone (Medrol Dospak) 4 mg tablets    Left Achilles tendinitis      Appears to have developed L achilles tendonitis in the setting of R BKA.   Rec heel lift  RF ortho     Relevant Orders    Referral to Orthopaedic Surgery

## 2024-05-17 ENCOUNTER — HOSPITAL ENCOUNTER (OUTPATIENT)
Dept: RADIOLOGY | Facility: CLINIC | Age: 54
Discharge: HOME | End: 2024-05-17
Payer: COMMERCIAL

## 2024-05-17 ENCOUNTER — TELEPHONE (OUTPATIENT)
Dept: PRIMARY CARE | Facility: CLINIC | Age: 54
End: 2024-05-17
Payer: COMMERCIAL

## 2024-05-17 DIAGNOSIS — R79.89 ELEVATED LFTS: ICD-10-CM

## 2024-05-17 PROCEDURE — 76705 ECHO EXAM OF ABDOMEN: CPT

## 2024-05-17 NOTE — TELEPHONE ENCOUNTER
Called pt   Small polyp, small amt sludge   Unlikely cause of elevated LFTs  Asym  Offered gen surg vs watchful waiting  Will hold off on RF  Cont w liver serology eval and rpt LFTs

## 2024-05-28 ENCOUNTER — APPOINTMENT (OUTPATIENT)
Dept: PRIMARY CARE | Facility: CLINIC | Age: 54
End: 2024-05-28
Payer: COMMERCIAL

## 2024-06-06 ENCOUNTER — APPOINTMENT (OUTPATIENT)
Dept: PRIMARY CARE | Facility: CLINIC | Age: 54
End: 2024-06-06
Payer: COMMERCIAL

## 2024-06-10 ENCOUNTER — OFFICE VISIT (OUTPATIENT)
Dept: ORTHOPEDIC SURGERY | Facility: HOSPITAL | Age: 54
End: 2024-06-10
Payer: COMMERCIAL

## 2024-06-10 ENCOUNTER — HOSPITAL ENCOUNTER (OUTPATIENT)
Dept: RADIOLOGY | Facility: HOSPITAL | Age: 54
Discharge: HOME | End: 2024-06-10
Payer: COMMERCIAL

## 2024-06-10 DIAGNOSIS — M72.2 PLANTAR FASCIITIS, LEFT: Primary | ICD-10-CM

## 2024-06-10 DIAGNOSIS — M76.62 LEFT ACHILLES TENDINITIS: ICD-10-CM

## 2024-06-10 DIAGNOSIS — M79.672 LEFT FOOT PAIN: ICD-10-CM

## 2024-06-10 PROCEDURE — L3332 SHOE LIFTS TAPERED TO ONE-HA: HCPCS | Performed by: ORTHOPAEDIC SURGERY

## 2024-06-10 PROCEDURE — 99214 OFFICE O/P EST MOD 30 MIN: CPT | Performed by: ORTHOPAEDIC SURGERY

## 2024-06-10 PROCEDURE — 73630 X-RAY EXAM OF FOOT: CPT | Mod: LEFT SIDE | Performed by: RADIOLOGY

## 2024-06-10 PROCEDURE — 73630 X-RAY EXAM OF FOOT: CPT | Mod: LT

## 2024-06-10 NOTE — PROGRESS NOTES
HISTORY OF PRESENT ILLNESS  This is a pleasant 54 y.o. year old male  who presents on 06/10/2024 at the request of Brittany Behm, DO for evaluation of  left heel and ankle  pain that has been present over/since  3 months .    How the condition occurred or started: plantar heel pain, calf pain  Location of pain (patient points to): insidious, no injury  Quality of pain: Moderate  Modifying Factors: increased with prolonged walking/standing, getting up out of bed feel pain and then gets better once moving  Associated Signs and symptoms: no N/T  Previous Treatment: hell lifts but using part of a t-shirt underneath  PSH R BKA and uses prosthesis  Quit smoking last month    PHYSICAL EXAMINATION  Constitutional Exam: patient's height and weight reviewed, well-kempt  Psychiatric Exam: alert and oriented x 3, appropriate mood and behavior  Eye Exam: EFFIEALBER  Pulmonary Exam: breathing non-labored, no apparent distress  Lymphatic exam: no appreciable lymphadenopathy in the lower extremities  Cardiovascular exam: DP pulses 2+ bilaterally, PT 2+ bilaterally, toes are pink with good capillary refill, no pitting edema  Skin exam: no open lesions, rashes, abrasions or ulcerations  Neurological exam: sensation to light touch intact in both lower extremities in peripheral and dermatomal distributions (except for any abnormalities noted in musculoskeletal exam)    Musculoskeletal exam: left foot and ankle: pain over gastroc and MT area, negative fernandez test, pain over plantar fascia medial band, ROM of ankle and ST joint normal, stable ligamentous exam, no effusion, tight achilles    DATA/RESULTS REVIEW: I personally reviewed the patient's x-ray images and reports of the  left ankle showing no acute findings or fractures, plantar shelf, no posterior exostosis calcaneus .     ASSESSMENT: left achilles tendinitis/gastroc strain, left plantar fasciitis  PLAN: I discussed with the patient the differential diagnosis, complex overuse  "and inflexibility related and degenerative related nature of the condition(s) and available treatment option(s).  Recommend stretching program plantar fascia protocol which we showed to the patient today to be done 3 times a day for 6 weeks and then daily afterwards.  Recommend use of night splint at nighttime for 6 weeks to help maintain flexibility.  Due to his right below-knee amputation and prosthesis it would be difficult mostly for him to use a tall cam boot on the left foot and ankle and so he and I would like to hold off on that at this time.  Use of Voltaren gel and rub the areas of pain up to 3 times a day.  Recommend physical therapy and wrote out a protocol for the physical therapist to work with the patient.  He should do well with nonoperative management.  No evidence for any Achilles rupture.  He can supplement of ibuprofen ending on how his pain is doing.  We discussed with him activity modification.  The patient's questions were answered in detail.      Note dictated with Next 1 Interactive software, completed without full type editing to avoid delay.      Dear Referring Physician,  It was my pleasure to see your patient, in the office today.  Please refer to the detailed notes above for my findings and recommendations.  Thank you once again for your consultation request.  If you have any further questions or concerns, please feel free to contact me via email or at the phone number and address below.  Sincerely,    Kriss Verdin MD   of Orthopedic Surgery, [employer]  Dual Fellowship-trained in Orthopedic Sports Medicine (Knee and Shoulder), and Foot and Ankle Surgery  [facility] [place]   ABOS Subspecialty Certificate in Orthopedic Sports Medicine  Head Team Physician Case \"[place]\" and [facility] \"Storm\"  Team [employer] USOP Physician [id number] Paralympic Games  Team [place] US Figure Skating Medical " Practitioner, including International Event Coverage  Director, Foot and Ankle Division, Department of Orthopedics    [address], [place] [address]  [email or web address]  Office [phone]

## 2024-09-05 ENCOUNTER — APPOINTMENT (OUTPATIENT)
Dept: GASTROENTEROLOGY | Facility: CLINIC | Age: 54
End: 2024-09-05
Payer: COMMERCIAL

## 2024-09-13 ENCOUNTER — TELEPHONE (OUTPATIENT)
Dept: PRIMARY CARE | Facility: CLINIC | Age: 54
End: 2024-09-13
Payer: COMMERCIAL

## 2024-10-07 ENCOUNTER — LAB (OUTPATIENT)
Dept: LAB | Facility: LAB | Age: 54
End: 2024-10-07
Payer: COMMERCIAL

## 2024-10-07 DIAGNOSIS — R79.89 ELEVATED LFTS: ICD-10-CM

## 2024-10-07 DIAGNOSIS — B19.20 HEPATITIS C VIRUS INFECTION WITHOUT HEPATIC COMA, UNSPECIFIED CHRONICITY: ICD-10-CM

## 2024-10-07 PROCEDURE — 80076 HEPATIC FUNCTION PANEL: CPT

## 2024-10-07 PROCEDURE — 36415 COLL VENOUS BLD VENIPUNCTURE: CPT

## 2024-10-07 PROCEDURE — 86038 ANTINUCLEAR ANTIBODIES: CPT

## 2024-10-07 PROCEDURE — 83550 IRON BINDING TEST: CPT

## 2024-10-07 PROCEDURE — 82105 ALPHA-FETOPROTEIN SERUM: CPT

## 2024-10-07 PROCEDURE — 80074 ACUTE HEPATITIS PANEL: CPT

## 2024-10-07 PROCEDURE — 83540 ASSAY OF IRON: CPT

## 2024-10-07 PROCEDURE — 82103 ALPHA-1-ANTITRYPSIN TOTAL: CPT

## 2024-10-07 PROCEDURE — 82390 ASSAY OF CERULOPLASMIN: CPT

## 2024-10-07 PROCEDURE — 86256 FLUORESCENT ANTIBODY TITER: CPT

## 2024-10-07 PROCEDURE — 87521 HEPATITIS C PROBE&RVRS TRNSC: CPT

## 2024-10-07 PROCEDURE — 85610 PROTHROMBIN TIME: CPT

## 2024-10-07 PROCEDURE — 87902 NFCT AGT GNTYP ALYS HEP C: CPT

## 2024-10-07 PROCEDURE — 86381 MITOCHONDRIAL ANTIBODY EACH: CPT

## 2024-10-07 PROCEDURE — 82728 ASSAY OF FERRITIN: CPT

## 2024-10-07 PROCEDURE — 86015 ACTIN ANTIBODY EACH: CPT

## 2024-10-08 ENCOUNTER — TELEPHONE (OUTPATIENT)
Dept: PRIMARY CARE | Facility: CLINIC | Age: 54
End: 2024-10-08
Payer: COMMERCIAL

## 2024-10-08 DIAGNOSIS — B19.20 HEPATITIS C VIRUS INFECTION WITHOUT HEPATIC COMA, UNSPECIFIED CHRONICITY: Primary | ICD-10-CM

## 2024-10-08 LAB
A1AT SERPL NEPH-MCNC: 180 MG/DL (ref 84–218)
AFP SERPL-MCNC: 5 NG/ML (ref 0–9)
ALBUMIN SERPL BCP-MCNC: 4.3 G/DL (ref 3.4–5)
ALP SERPL-CCNC: 73 U/L (ref 33–120)
ALT SERPL W P-5'-P-CCNC: 328 U/L (ref 10–52)
ANA SER QL HEP2 SUBST: NEGATIVE
AST SERPL W P-5'-P-CCNC: 154 U/L (ref 9–39)
BILIRUB DIRECT SERPL-MCNC: 0.1 MG/DL (ref 0–0.3)
BILIRUB SERPL-MCNC: 0.8 MG/DL (ref 0–1.2)
CERULOPLASMIN SERPL-MCNC: 28.7 MG/DL (ref 20–60)
FERRITIN SERPL-MCNC: 250 NG/ML (ref 20–300)
HAV IGM SER QL: NONREACTIVE
HBV CORE IGM SER QL: NONREACTIVE
HBV SURFACE AG SERPL QL IA: NONREACTIVE
HCV AB SER QL: REACTIVE
HCV RNA SERPL NAA+PROBE-ACNC: ABNORMAL IU/ML
HCV RNA SERPL NAA+PROBE-ACNC: DETECTED K[IU]/ML
HCV RNA SERPL NAA+PROBE-LOG IU: 6.81 LOG IU/ML
INR PPP: 1.1 (ref 0.9–1.1)
IRON SATN MFR SERPL: 37 % (ref 25–45)
IRON SERPL-MCNC: 136 UG/DL (ref 35–150)
LABORATORY COMMENT REPORT: ABNORMAL
MITOCHONDRIA AB SER QL IF: NEGATIVE
PROT SERPL-MCNC: 7.5 G/DL (ref 6.4–8.2)
PROTHROMBIN TIME: 12.6 SECONDS (ref 9.8–12.8)
TIBC SERPL-MCNC: 372 UG/DL (ref 240–445)
UIBC SERPL-MCNC: 236 UG/DL (ref 110–370)

## 2024-10-09 ENCOUNTER — APPOINTMENT (OUTPATIENT)
Dept: PRIMARY CARE | Facility: CLINIC | Age: 54
End: 2024-10-09
Payer: COMMERCIAL

## 2024-10-09 VITALS
TEMPERATURE: 98.6 F | WEIGHT: 231 LBS | OXYGEN SATURATION: 98 % | SYSTOLIC BLOOD PRESSURE: 116 MMHG | DIASTOLIC BLOOD PRESSURE: 78 MMHG | BODY MASS INDEX: 34.21 KG/M2 | HEART RATE: 87 BPM | RESPIRATION RATE: 16 BRPM | HEIGHT: 69 IN

## 2024-10-09 DIAGNOSIS — I10 HYPERTENSION GOAL BP (BLOOD PRESSURE) < 130/80: ICD-10-CM

## 2024-10-09 DIAGNOSIS — R76.8 HCV ANTIBODY POSITIVE: ICD-10-CM

## 2024-10-09 DIAGNOSIS — E78.5 HYPERLIPIDEMIA, UNSPECIFIED HYPERLIPIDEMIA TYPE: ICD-10-CM

## 2024-10-09 DIAGNOSIS — F17.210 CIGARETTE NICOTINE DEPENDENCE WITHOUT COMPLICATION: ICD-10-CM

## 2024-10-09 DIAGNOSIS — Z00.00 ANNUAL PHYSICAL EXAM: Primary | ICD-10-CM

## 2024-10-09 DIAGNOSIS — S88.111D BELOW-KNEE AMPUTATION OF RIGHT LOWER EXTREMITY, SUBSEQUENT ENCOUNTER (MULTI): ICD-10-CM

## 2024-10-09 DIAGNOSIS — R53.83 OTHER FATIGUE: ICD-10-CM

## 2024-10-09 LAB — SMOOTH MUSCLE AB SER QL IF: ABNORMAL

## 2024-10-09 PROCEDURE — 99396 PREV VISIT EST AGE 40-64: CPT | Performed by: FAMILY MEDICINE

## 2024-10-09 PROCEDURE — 99214 OFFICE O/P EST MOD 30 MIN: CPT | Performed by: FAMILY MEDICINE

## 2024-10-09 PROCEDURE — 3074F SYST BP LT 130 MM HG: CPT | Performed by: FAMILY MEDICINE

## 2024-10-09 PROCEDURE — 3008F BODY MASS INDEX DOCD: CPT | Performed by: FAMILY MEDICINE

## 2024-10-09 PROCEDURE — 3078F DIAST BP <80 MM HG: CPT | Performed by: FAMILY MEDICINE
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
## 2024-10-16 LAB
ELECTRONICALLY SIGNED BY: NORMAL
HCV GENTYP SERPL SEQ: NORMAL
HEPATITIS C INTERPRETATION: NORMAL

## 2024-10-28 ENCOUNTER — TELEPHONE (OUTPATIENT)
Dept: PRIMARY CARE | Facility: CLINIC | Age: 54
End: 2024-10-28
Payer: COMMERCIAL

## 2024-12-09 DIAGNOSIS — I10 HYPERTENSION GOAL BP (BLOOD PRESSURE) < 130/80: ICD-10-CM

## 2024-12-09 DIAGNOSIS — I10 HYPERTENSION, UNSPECIFIED TYPE: ICD-10-CM

## 2024-12-09 RX ORDER — AMLODIPINE BESYLATE 10 MG/1
10 TABLET ORAL DAILY
Qty: 90 TABLET | Refills: 3 | Status: SHIPPED | OUTPATIENT
Start: 2024-12-09

## 2024-12-09 RX ORDER — OLMESARTAN MEDOXOMIL 40 MG/1
40 TABLET ORAL DAILY
Qty: 30 TABLET | Refills: 11 | Status: SHIPPED | OUTPATIENT
Start: 2024-12-09 | End: 2025-12-09

## 2025-01-16 ENCOUNTER — APPOINTMENT (OUTPATIENT)
Dept: PRIMARY CARE | Facility: CLINIC | Age: 55
End: 2025-01-16
Payer: COMMERCIAL

## 2025-01-21 ENCOUNTER — APPOINTMENT (OUTPATIENT)
Dept: PRIMARY CARE | Facility: CLINIC | Age: 55
End: 2025-01-21
Payer: COMMERCIAL

## 2025-01-21 VITALS
WEIGHT: 230 LBS | RESPIRATION RATE: 16 BRPM | OXYGEN SATURATION: 100 % | DIASTOLIC BLOOD PRESSURE: 72 MMHG | TEMPERATURE: 98 F | HEART RATE: 99 BPM | BODY MASS INDEX: 34.07 KG/M2 | SYSTOLIC BLOOD PRESSURE: 140 MMHG | HEIGHT: 69 IN

## 2025-01-21 DIAGNOSIS — J06.9 UPPER RESPIRATORY TRACT INFECTION, UNSPECIFIED TYPE: ICD-10-CM

## 2025-01-21 DIAGNOSIS — R76.8 HCV ANTIBODY POSITIVE: ICD-10-CM

## 2025-01-21 DIAGNOSIS — R06.09 DOE (DYSPNEA ON EXERTION): Primary | ICD-10-CM

## 2025-01-21 DIAGNOSIS — F17.210 CIGARETTE NICOTINE DEPENDENCE WITHOUT COMPLICATION: ICD-10-CM

## 2025-01-21 DIAGNOSIS — N52.9 ERECTILE DYSFUNCTION, UNSPECIFIED ERECTILE DYSFUNCTION TYPE: ICD-10-CM

## 2025-01-21 PROCEDURE — 3008F BODY MASS INDEX DOCD: CPT | Performed by: FAMILY MEDICINE

## 2025-01-21 PROCEDURE — 99214 OFFICE O/P EST MOD 30 MIN: CPT | Performed by: FAMILY MEDICINE

## 2025-01-21 PROCEDURE — 3078F DIAST BP <80 MM HG: CPT | Performed by: FAMILY MEDICINE

## 2025-01-21 PROCEDURE — 3077F SYST BP >= 140 MM HG: CPT | Performed by: FAMILY MEDICINE

## 2025-01-21 RX ORDER — TADALAFIL 10 MG/1
10 TABLET ORAL DAILY PRN
Qty: 10 TABLET | Refills: 5 | Status: SHIPPED | OUTPATIENT
Start: 2025-01-21 | End: 2026-01-21

## 2025-01-21 ASSESSMENT — PATIENT HEALTH QUESTIONNAIRE - PHQ9
1. LITTLE INTEREST OR PLEASURE IN DOING THINGS: NOT AT ALL
SUM OF ALL RESPONSES TO PHQ9 QUESTIONS 1 AND 2: 0
2. FEELING DOWN, DEPRESSED OR HOPELESS: NOT AT ALL

## 2025-01-21 NOTE — PROGRESS NOTES
"Subjective   Sly Patel is a 54 y.o. male who presents for Follow-up.  HPI      Cough and congestion for a week. Took few days of doxy leftover and started to feel better. Still lingering.     Seeing hep next wk for hep C     Losing energy w AM workouts. Feels drained. Comes and goes. No cp. Feels ok doing his job.     Trouble w achieving erection. Was not sexually active for last 10 yr.   Current Outpatient Medications on File Prior to Visit   Medication Sig Dispense Refill    amLODIPine (Norvasc) 10 mg tablet Take 1 tablet (10 mg) by mouth once daily. 90 tablet 3    aspirin 81 mg EC tablet Take 1 tablet (81 mg) by mouth once daily.      carBAMazepine XR (TEGretol XR) 100 mg 12 hr tablet Take 1 tablet (100 mg) by mouth every 12 hours. Do not crush, chew, or split. 180 tablet 1    clindamycin (Cleocin T) 1 % lotion Apply topically if needed.      olmesartan (BENIcar) 40 mg tablet Take 1 tablet (40 mg) by mouth once daily. 30 tablet 11    pantoprazole (ProtoNix) 40 mg EC tablet Take 1 tablet (40 mg) by mouth once daily in the morning. Take before meals. Do not crush, chew, or split.      atorvastatin (Lipitor) 40 mg tablet Take 1 tablet (40 mg) by mouth once daily. as directed (Patient not taking: Reported on 1/21/2025) 90 tablet 3     No current facility-administered medications on file prior to visit.                  Objective   /72   Pulse 99   Temp 36.7 °C (98 °F)   Resp 16   Ht 1.753 m (5' 9\")   Wt 104 kg (230 lb)   SpO2 100%   BMI 33.97 kg/m²    Physical Exam  General: NAD  HEENT:NCAT, PERRLA, nml OP, b/l TM clear   Neck: no cervical ARELI  Heart: RRR no murmur,  Lungs: CTA b/l, no wheeze or rhonchi   GI: abd soft, nontender, nondistended.   MSK: no c/c/e. R BKA  Skin: warm and dry  Psych: cooperative, appropriate affect  Neuro: speech clear. A&Ox3  Assessment/Plan   Problem List Items Addressed This Visit       Cigarette nicotine dependence without complication  Advised to complete LDCT "        Other Visit Diagnoses       RAMOS (dyspnea on exertion)    -  Primary  CAC in 1000s  Neg stress 2023 but sinve more Sx will rpt stress test     Relevant Orders    Nuclear Stress Test    Erectile dysfunction, unspecified erectile dysfunction type      Trial of cialis.   Viagra not effective in past       Relevant Medications    tadalafil (Cialis) 10 mg tablet    HCV antibody positive      Upcoming appt dr Rodriguez next wk       Upper respiratory tract infection, unspecified type      Improving   Cont supportive care    RTC 3 mo or prn

## 2025-01-30 ENCOUNTER — OFFICE VISIT (OUTPATIENT)
Dept: GASTROENTEROLOGY | Facility: CLINIC | Age: 55
End: 2025-01-30
Payer: COMMERCIAL

## 2025-01-30 VITALS
SYSTOLIC BLOOD PRESSURE: 154 MMHG | HEART RATE: 102 BPM | HEIGHT: 70 IN | BODY MASS INDEX: 34.07 KG/M2 | OXYGEN SATURATION: 98 % | DIASTOLIC BLOOD PRESSURE: 97 MMHG | WEIGHT: 238 LBS

## 2025-01-30 DIAGNOSIS — B18.2 CHRONIC HEPATITIS C VIRUS GENOTYPE 1A INFECTION (MULTI): Primary | ICD-10-CM

## 2025-01-30 DIAGNOSIS — R76.8 HCV ANTIBODY POSITIVE: ICD-10-CM

## 2025-01-30 PROCEDURE — G2211 COMPLEX E/M VISIT ADD ON: HCPCS | Performed by: INTERNAL MEDICINE

## 2025-01-30 PROCEDURE — 99214 OFFICE O/P EST MOD 30 MIN: CPT | Performed by: INTERNAL MEDICINE

## 2025-01-30 PROCEDURE — 3008F BODY MASS INDEX DOCD: CPT | Performed by: INTERNAL MEDICINE

## 2025-01-30 PROCEDURE — 3080F DIAST BP >= 90 MM HG: CPT | Performed by: INTERNAL MEDICINE

## 2025-01-30 PROCEDURE — 99204 OFFICE O/P NEW MOD 45 MIN: CPT | Performed by: INTERNAL MEDICINE

## 2025-01-30 PROCEDURE — 3077F SYST BP >= 140 MM HG: CPT | Performed by: INTERNAL MEDICINE

## 2025-01-30 NOTE — PATIENT INSTRUCTIONS
"Welcome to Dr. [facility].  Dr. Rodriguez sees patients at the following sites:  [place] [address] [facility] [address] at [facility] at [facility], [facility] [address]    Dr. Rodriguez's hepatology care coordinator, Denise BARRETO, can be reached at [phone].  Dr. Rodriguez's , Riddhi Fish, can be reached at [phone].    Get baseline testing done.    Get blood work.     Get a Fibroscan of your Liver now.  Do not eat or drink anything 3 hours prior to your exam.   Schedule on the way out from your visit today, or call [phone].  When calling and after prompts, state \"make an appointment,\" and then \"gastro\" to get to the appropriate .    Get a Liver Ultrasound.  Do not eat or drink anything 6 hours prior to your exam.  Call [phone] to schedule.    Likely will proceed with a prescription with Epclusa for Hepatitis C treatment. My office will call you with results and plan.   "
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
## 2025-01-30 NOTE — PROGRESS NOTES
Initial evaluation of chronic HCV infection  Subjective     History of Present Illness:   Sly Patel is a 54 y.o. male who presents to the [facility] at [facility] for an initial evaluation.    1991 - major injury related to a fall. RKA, now with prosthesis.    Remote history of drug use.  2010 - prescribed percocet, etc. Was addicted. 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 Mostly intranasal, couple of instances of IV.  Stopped all narcotics in 2015.    No alcohol since 2002.    Diagnosed with HCV.  Has Gt1a  HCV RNA 6.5 million IU    First tested positive in 2018, according to records from [facility].  Patient explains he was never aware of having HCV until Dr. Behm confirmed a diagnosis last year.    Review of Systems  Review of Systems  Negative, 12 pt ROS.    Past Medical History   has a past medical history of Cough (02/01/2023).     Social History   reports that he has been smoking. He has never used smokeless tobacco. He reports that he does not drink alcohol and does not use drugs.     Works full time - heating and AC  Lives in [facility]  Daughter lives with Sly.     Family History  family history includes Hypertension in his mother.     Allergies  No Known Allergies    Medications  Current Outpatient Medications   Medication Instructions    amLODIPine (NORVASC) 10 mg, oral, Daily    aspirin 81 mg, Daily    atorvastatin (LIPITOR) 40 mg, oral, Daily, as directed    carBAMazepine XR (TEGRETOL XR) 100 mg, oral, Every 12 hours, Do not crush, chew, or split.    clindamycin (Cleocin T) 1 % lotion As needed    olmesartan (BENICAR) 40 mg, oral, Daily    pantoprazole (PROTONIX) 40 mg, Daily before breakfast    tadalafil (CIALIS) 10 mg, oral, Daily PRN        Objective   Visit Vitals  BP (!) 154/97   Pulse 102          4/27/2023     1:05 PM 10/31/2023     3:24 PM 11/21/2023     3:25 PM 3/4/2024     7:52 AM 10/9/2024    11:55 AM 1/21/2025     1:05 PM 1/30/2025     4:17  "PM   Vitals   Systolic 116 130  144 116 140 154   Diastolic 71 82  76 78 72 97   BP Location    Left arm Left arm     Heart Rate 87 86  93 87 99 102   Temp 35.8 °C (96.5 °F)   36.7 °C (98 °F) 37 °C (98.6 °F) 36.7 °C (98 °F)    Resp    16 16 16    Height 1.778 m (5' 10\") 1.803 m (5' 11\") 1.778 m (5' 10\") 1.753 m (5' 9\") 1.753 m (5' 9\") 1.753 m (5' 9\") 1.778 m (5' 10\")   Weight (lb) 232 228 229 215.2 231 230 238   BMI 33.29 kg/m2 31.8 kg/m2 32.86 kg/m2 31.78 kg/m2 34.11 kg/m2 33.97 kg/m2 34.15 kg/m2   BSA (m2) 2.28 m2 2.27 m2 2.27 m2 2.18 m2 2.26 m2 2.25 m2 2.31 m2   Visit Report Report Report Report Report Report Report Report     Physical Exam    General: NAD  HEENT:NCAT, PERRLA, nml OP, b/l TM clear   Neck: no cervical ARELI  Heart: RRR no murmur,  Lungs: CTA b/l, no wheeze or rhonchi   GI: abd soft, nontender, nondistended.   MSK: no c/c/e. R BKA  Skin: warm and dry  Psych: cooperative, appropriate affect  Neuro: speech clear. A&Ox3    Labs    WBC   Date/Time Value Ref Range Status   05/14/2024 09:21 AM 10.8 4.4 - 11.3 x10*3/uL Final     Hemoglobin   Date/Time Value Ref Range Status   05/14/2024 09:21 AM 13.8 13.5 - 17.5 g/dL Final     Hematocrit   Date/Time Value Ref Range Status   05/14/2024 09:21 AM 40.3 (L) 41.0 - 52.0 % Final     MCV   Date/Time Value Ref Range Status   05/14/2024 09:21 AM 95 80 - 100 fL Final     Platelets   Date/Time Value Ref Range Status   05/14/2024 09:21  150 - 450 x10*3/uL Final        Total Protein   Date/Time Value Ref Range Status   10/07/2024 05:04 PM 7.5 6.4 - 8.2 g/dL Final     Albumin   Date/Time Value Ref Range Status   10/07/2024 05:04 PM 4.3 3.4 - 5.0 g/dL Final     AST   Date/Time Value Ref Range Status   10/07/2024 05:04  (H) 9 - 39 U/L Final     ALT   Date/Time Value Ref Range Status   10/07/2024 05:04  (H) 10 - 52 U/L Final     Comment:     Patients treated with Sulfasalazine may generate falsely decreased results for ALT.     Alkaline Phosphatase " "  Date/Time Value Ref Range Status   10/07/2024 05:04 PM 73 33 - 120 U/L Final     Bilirubin, Total   Date/Time Value Ref Range Status   10/07/2024 05:04 PM 0.8 0.0 - 1.2 mg/dL Final     Bilirubin, Direct   Date/Time Value Ref Range Status   10/07/2024 05:04 PM 0.1 0.0 - 0.3 mg/dL Final        No results found for: \"VITD25\", \"VITAMINA\", \"VTAI\", \"VITEALPHA\", \"VITEGAMMA\"     AST   Date/Time Value Ref Range Status   10/07/2024 05:04  (H) 9 - 39 U/L Final     ALT   Date/Time Value Ref Range Status   10/07/2024 05:04  (H) 10 - 52 U/L Final     Comment:     Patients treated with Sulfasalazine may generate falsely decreased results for ALT.     Alkaline Phosphatase   Date/Time Value Ref Range Status   10/07/2024 05:04 PM 73 33 - 120 U/L Final     Bilirubin, Total   Date/Time Value Ref Range Status   10/07/2024 05:04 PM 0.8 0.0 - 1.2 mg/dL Final     Bilirubin, Direct   Date/Time Value Ref Range Status   10/07/2024 05:04 PM 0.1 0.0 - 0.3 mg/dL Final     Albumin   Date/Time Value Ref Range Status   10/07/2024 05:04 PM 4.3 3.4 - 5.0 g/dL Final     Total Protein   Date/Time Value Ref Range Status   10/07/2024 05:04 PM 7.5 6.4 - 8.2 g/dL Final        Protime   Date/Time Value Ref Range Status   10/07/2024 05:04 PM 12.6 9.8 - 12.8 seconds Final     INR   Date/Time Value Ref Range Status   10/07/2024 05:04 PM 1.1 0.9 - 1.1 Final     2018 Labs    Component  Ref Range & Units 6 yr ago   Albumin  3.4 - 5.1 g/dL 4.5   Bilirubin, Direct  0.10 - 0.30 mg/dL 0.10   Bilirubin, Total  0.1 - 1.5 mg/dL 0.5   Alkaline Phosphatase  40 - 200 IU/L 80   ALT (SGPT)  7 - 40 IU/L 35   AST (SGOT)  7 - 40 IU/L 28   Protein, Total  6.2 - 8.3 g/dL 7.2       HIV 1/2 Ab non reactive  HCV RNA 16.3 million.  HB S Ag negative  Assessment/Plan   Sly Patel is a 54 y.o. male who presents to Liver clinic for an initial evaluation for management and treatment of chronic HCV infection.    His LFTs are quite high.    His HCV appears to be " chronic. There is documented HCV viremia since at least 2018, and risk factors that date back to 2010.  Gt1a  Treatment naive.   Never underwent fibrosis staging.    Plan:  Rescreen for HAV, HBV infections.  Repeat HCV RNA.  FibroScan.  Will plan on treating his chronic HCV infection and reassessment of liver function after treatment.    I provided counseling regarding HCV infection - including prognosis, risk for cirrhosis/HCC and risk of transmission. I discussed treatment and the treatment process.    Davion Rodriguez MD      Instructions      Davion Rodriguez MD

## 2025-02-01 DIAGNOSIS — G62.9 PERIPHERAL POLYNEUROPATHY: ICD-10-CM

## 2025-02-03 DIAGNOSIS — R76.8 HCV ANTIBODY POSITIVE: ICD-10-CM

## 2025-02-03 DIAGNOSIS — B18.2 CHRONIC HEPATITIS C VIRUS GENOTYPE 1A INFECTION (MULTI): ICD-10-CM

## 2025-02-03 RX ORDER — CARBAMAZEPINE 100 MG/1
TABLET, EXTENDED RELEASE ORAL
Qty: 180 TABLET | Refills: 3 | Status: SHIPPED | OUTPATIENT
Start: 2025-02-03

## 2025-02-05 ENCOUNTER — CLINICAL SUPPORT (OUTPATIENT)
Dept: GASTROENTEROLOGY | Facility: CLINIC | Age: 55
End: 2025-02-05
Payer: COMMERCIAL

## 2025-02-05 ENCOUNTER — HOSPITAL ENCOUNTER (OUTPATIENT)
Dept: RADIOLOGY | Facility: CLINIC | Age: 55
Discharge: HOME | End: 2025-02-05
Payer: COMMERCIAL

## 2025-02-05 DIAGNOSIS — B18.2 CHRONIC HEPATITIS C VIRUS GENOTYPE 1A INFECTION (MULTI): ICD-10-CM

## 2025-02-05 DIAGNOSIS — R76.8 HCV ANTIBODY POSITIVE: ICD-10-CM

## 2025-02-05 LAB
AFP-TM SERPL-MCNC: 3.7 NG/ML
ALBUMIN SERPL-MCNC: 4.1 G/DL (ref 3.6–5.1)
ALP SERPL-CCNC: 82 U/L (ref 35–144)
ALT SERPL-CCNC: 94 U/L (ref 9–46)
ANION GAP SERPL CALCULATED.4IONS-SCNC: 9 MMOL/L (CALC) (ref 7–17)
AST SERPL-CCNC: 47 U/L (ref 10–35)
BASOPHILS # BLD AUTO: 37 CELLS/UL (ref 0–200)
BASOPHILS NFR BLD AUTO: 0.5 %
BILIRUB SERPL-MCNC: 0.4 MG/DL (ref 0.2–1.2)
BUN SERPL-MCNC: 16 MG/DL (ref 7–25)
CALCIUM SERPL-MCNC: 8.8 MG/DL (ref 8.6–10.3)
CHLORIDE SERPL-SCNC: 107 MMOL/L (ref 98–110)
CO2 SERPL-SCNC: 25 MMOL/L (ref 20–32)
CREAT SERPL-MCNC: 1.03 MG/DL (ref 0.7–1.3)
EGFRCR SERPLBLD CKD-EPI 2021: 86 ML/MIN/1.73M2
EOSINOPHIL # BLD AUTO: 170 CELLS/UL (ref 15–500)
EOSINOPHIL NFR BLD AUTO: 2.3 %
ERYTHROCYTE [DISTWIDTH] IN BLOOD BY AUTOMATED COUNT: 11.9 % (ref 11–15)
GLUCOSE SERPL-MCNC: 124 MG/DL (ref 65–99)
HAV AB SER QL IA: NORMAL
HBV CORE AB SERPL QL IA: REACTIVE
HBV DNA SERPL NAA+PROBE-ACNC: NOT DETECTED IU/ML
HBV DNA SERPL NAA+PROBE-LOG IU: NOT DETECTED LOG IU/ML
HBV SURFACE AB SERPL IA-ACNC: 188 MIU/ML
HBV SURFACE AG SERPL QL IA: NORMAL
HCT VFR BLD AUTO: 39 % (ref 38.5–50)
HCV RNA SERPL NAA+PROBE-ACNC: ABNORMAL IU/ML
HCV RNA SERPL NAA+PROBE-LOG IU: 7.2 LOG IU/ML
HGB BLD-MCNC: 13.1 G/DL (ref 13.2–17.1)
INR PPP: 1
LYMPHOCYTES # BLD AUTO: 2716 CELLS/UL (ref 850–3900)
LYMPHOCYTES NFR BLD AUTO: 36.7 %
MCH RBC QN AUTO: 32 PG (ref 27–33)
MCHC RBC AUTO-ENTMCNC: 33.6 G/DL (ref 32–36)
MCV RBC AUTO: 95.4 FL (ref 80–100)
MONOCYTES # BLD AUTO: 466 CELLS/UL (ref 200–950)
MONOCYTES NFR BLD AUTO: 6.3 %
NEUTROPHILS # BLD AUTO: 4011 CELLS/UL (ref 1500–7800)
NEUTROPHILS NFR BLD AUTO: 54.2 %
PLATELET # BLD AUTO: 166 THOUSAND/UL (ref 140–400)
PMV BLD REES-ECKER: 10.1 FL (ref 7.5–12.5)
POTASSIUM SERPL-SCNC: 3.9 MMOL/L (ref 3.5–5.3)
PROT SERPL-MCNC: 6.7 G/DL (ref 6.1–8.1)
PROTHROMBIN TIME: 10.9 SEC (ref 9–11.5)
RBC # BLD AUTO: 4.09 MILLION/UL (ref 4.2–5.8)
SODIUM SERPL-SCNC: 141 MMOL/L (ref 135–146)
WBC # BLD AUTO: 7.4 THOUSAND/UL (ref 3.8–10.8)

## 2025-02-05 PROCEDURE — 91200 LIVER ELASTOGRAPHY: CPT | Performed by: INTERNAL MEDICINE

## 2025-02-05 PROCEDURE — 76705 ECHO EXAM OF ABDOMEN: CPT | Performed by: RADIOLOGY

## 2025-02-05 PROCEDURE — 76705 ECHO EXAM OF ABDOMEN: CPT

## 2025-02-06 ENCOUNTER — SPECIALTY PHARMACY (OUTPATIENT)
Dept: PHARMACY | Facility: CLINIC | Age: 55
End: 2025-02-06

## 2025-02-06 RX ORDER — VELPATASVIR AND SOFOSBUVIR 100; 400 MG/1; MG/1
1 TABLET, FILM COATED ORAL DAILY
Qty: 28 TABLET | Refills: 2 | Status: SHIPPED | OUTPATIENT
Start: 2025-02-06 | End: 2025-05-01

## 2025-02-06 NOTE — PROGRESS NOTES
Results:  E (median liver stiffness measurement):   8.2   kPa  CAP (controlled attenuation parameter):  202  dB/m    Interpretation:  This was a technically adequate study. The Fibrosis score is consistent with Metavir F2. The CAP score is consistent with 0 - 4% hepatocyte steatosis (steatosis grade 0 of 3) .    Davion Rodriguez MD  Hepatology

## 2025-02-10 ENCOUNTER — SPECIALTY PHARMACY (OUTPATIENT)
Dept: PHARMACY | Facility: HOSPITAL | Age: 55
End: 2025-02-10
Payer: COMMERCIAL

## 2025-02-10 DIAGNOSIS — B18.2 CHRONIC HEPATITIS C WITHOUT HEPATIC COMA (MULTI): Primary | ICD-10-CM

## 2025-02-10 DIAGNOSIS — B18.2 CHRONIC HEPATITIS C VIRUS GENOTYPE 1A INFECTION (MULTI): ICD-10-CM

## 2025-02-10 RX ORDER — VELPATASVIR AND SOFOSBUVIR 100; 400 MG/1; MG/1
1 TABLET, FILM COATED ORAL DAILY
Qty: 90 TABLET | Refills: 0 | Status: SHIPPED | OUTPATIENT
Start: 2025-02-10 | End: 2025-05-05

## 2025-02-10 NOTE — PROGRESS NOTES
The patient was called today to discuss 2 possible interactions with his HCV medication, sofosbuvir/velpatasvir.     1) carbamazepine- category X interaction- advised that is cannot be coadministered with the sof/gabriele. Patient states he has been using this for about 2 years for neuropathy. He is eager to start HCV treatment and is agreeable to holding the carbamazepine. Advised that we would need to ensure a 10 day washout period after stopping the med as the half-life is somewhat long. He was advised to discuss alternatives for neuropathy with his PCP. A message will be sent to her as well.    Updated patient later in the day to await stopping the carbamazepine as his doctor would work with him to develop a plan to taper. Patient states he is currently using the ER formulation (carbamazepine  mg po daily).    2) pantoprazole- patient is no longer using.    A script was resent to [facility], per insurance restrictions. Advised that he may need a copay card and he can sign up for it online.

## 2025-03-04 ENCOUNTER — APPOINTMENT (OUTPATIENT)
Dept: PRIMARY CARE | Facility: CLINIC | Age: 55
End: 2025-03-04
Payer: COMMERCIAL

## 2025-03-04 VITALS
SYSTOLIC BLOOD PRESSURE: 126 MMHG | HEIGHT: 70 IN | RESPIRATION RATE: 16 BRPM | HEART RATE: 95 BPM | WEIGHT: 248 LBS | DIASTOLIC BLOOD PRESSURE: 70 MMHG | TEMPERATURE: 98 F | OXYGEN SATURATION: 96 % | BODY MASS INDEX: 35.5 KG/M2

## 2025-03-04 DIAGNOSIS — G60.9 IDIOPATHIC PERIPHERAL NEUROPATHY: Primary | ICD-10-CM

## 2025-03-04 DIAGNOSIS — K21.9 GASTROESOPHAGEAL REFLUX DISEASE WITHOUT ESOPHAGITIS: ICD-10-CM

## 2025-03-04 DIAGNOSIS — N52.9 ERECTILE DYSFUNCTION, UNSPECIFIED ERECTILE DYSFUNCTION TYPE: ICD-10-CM

## 2025-03-04 PROCEDURE — 3008F BODY MASS INDEX DOCD: CPT | Performed by: FAMILY MEDICINE

## 2025-03-04 PROCEDURE — 99214 OFFICE O/P EST MOD 30 MIN: CPT | Performed by: FAMILY MEDICINE

## 2025-03-04 PROCEDURE — 3074F SYST BP LT 130 MM HG: CPT | Performed by: FAMILY MEDICINE

## 2025-03-04 PROCEDURE — 3078F DIAST BP <80 MM HG: CPT | Performed by: FAMILY MEDICINE

## 2025-03-04 RX ORDER — FAMOTIDINE 40 MG/1
40 TABLET, FILM COATED ORAL 2 TIMES DAILY
Qty: 60 TABLET | Refills: 11 | Status: SHIPPED | OUTPATIENT
Start: 2025-03-04 | End: 2026-03-04

## 2025-03-04 RX ORDER — TADALAFIL 20 MG/1
20 TABLET ORAL DAILY PRN
Qty: 10 TABLET | Refills: 5 | Status: SHIPPED | OUTPATIENT
Start: 2025-03-04 | End: 2026-03-04

## 2025-03-04 RX ORDER — GABAPENTIN 300 MG/1
300 CAPSULE ORAL NIGHTLY
Qty: 30 CAPSULE | Refills: 11 | Status: SHIPPED | OUTPATIENT
Start: 2025-03-04 | End: 2026-03-04

## 2025-03-04 ASSESSMENT — PATIENT HEALTH QUESTIONNAIRE - PHQ9
SUM OF ALL RESPONSES TO PHQ9 QUESTIONS 1 AND 2: 0
1. LITTLE INTEREST OR PLEASURE IN DOING THINGS: NOT AT ALL
2. FEELING DOWN, DEPRESSED OR HOPELESS: NOT AT ALL

## 2025-03-04 NOTE — PROGRESS NOTES
"Subjective   Sly Patel is a 54 y.o. male who presents for Med Refill.  HPI    Starting hep C Tx next wk but needs tegretol DC first which he takes for amputation neuro.     Stopped tegretol last wk. Was only taking 100 mg daily (not BID)  Gabapentin did not help in past but was also on other medications   Having breakthrough pain but not severe    GERD worse in last 1-2 wk. +burning. No assoc w food. No NSAIDs.     Cialis helped at first but then stopped.   Current Outpatient Medications on File Prior to Visit   Medication Sig Dispense Refill    amLODIPine (Norvasc) 10 mg tablet Take 1 tablet (10 mg) by mouth once daily. 90 tablet 3    aspirin 81 mg EC tablet Take 1 tablet (81 mg) by mouth once daily.      carBAMazepine XR (TEGretol XR) 100 mg 12 hr tablet TAKE 1 TABLET(100 MG) BY MOUTH EVERY 12 HOURS. DO NOT CRUSH, CHEW, OR SPLIT 180 tablet 3    clindamycin (Cleocin T) 1 % lotion Apply topically if needed.      olmesartan (BENIcar) 40 mg tablet Take 1 tablet (40 mg) by mouth once daily. 30 tablet 11    sofosbuvir-velpatasvir (Epclusa) 400-100 mg tablet Take 1 tablet by mouth once daily. 90 tablet 0    [DISCONTINUED] tadalafil (Cialis) 10 mg tablet Take 1 tablet (10 mg) by mouth once daily as needed for erectile dysfunction. 10 tablet 5     No current facility-administered medications on file prior to visit.                  Objective   /70   Pulse 95   Temp 36.7 °C (98 °F)   Resp 16   Ht 1.778 m (5' 10\")   Wt 112 kg (248 lb)   SpO2 96%   BMI 35.58 kg/m²    Physical Exam  General: NAD  HEENT:NCAT, PERRLA, nml OP  Neck: no cervical ARELI  Heart: RRR no murmur, no edema   Lungs: CTA b/l, no wheeze or rhonchi   GI: abd soft, nontender, nondistended.   MSK: no c/c  R LE prothesis   Skin: warm and dry  Psych: cooperative, appropriate affect  Neuro: speech clear. A&Ox3  Assessment/Plan   Problem List Items Addressed This Visit       Peripheral neuropathy - Primary  Stopping Tegretol d/t HCV Tx DDI "   Start sara 300 mg at bedtime in interim   Will plan to restart Tegretol after HCV Tx in 3 mo   F/up 1 mo       Relevant Medications    gabapentin (Neurontin) 300 mg capsule    Gastroesophageal reflux disease  Discussed diet changes  Start pepcid 40 mg BID   No PPI d/t DDI w Epclusa       Relevant Medications    famotidine (Pepcid) 40 mg tablet     Other Visit Diagnoses       Erectile dysfunction, unspecified erectile dysfunction type      Cilais 10 ineff  Inc to cialis 20       Relevant Medications    tadalafil (Cialis) 20 mg tablet    HCV: starting epclusa Tx next wk

## 2025-03-05 ENCOUNTER — SPECIALTY PHARMACY (OUTPATIENT)
Dept: PHARMACY | Facility: HOSPITAL | Age: 55
End: 2025-03-05
Payer: COMMERCIAL

## 2025-03-05 DIAGNOSIS — B18.2 CHRONIC HEPATITIS C WITHOUT HEPATIC COMA (MULTI): Primary | ICD-10-CM

## 2025-03-05 NOTE — PROGRESS NOTES
"Lab Results   Component Value Date    HCVPCRQUANT 15,800,000 (H) 02/03/2025    HCVPCRQUANT 6,530,000 (H) 10/07/2024    HCVGENO Genotype 1a 10/07/2024    AST 47 (H) 02/03/2025    ALT 94 (H) 02/03/2025    ALKPHOS 82 02/03/2025    HEPBCAB REACTIVE (A) 02/03/2025     No results found for: \"FIBROSSCORE\", \"FIBROSSTAGE\"  Results for orders placed in visit on 02/05/25    Liver Elastography (Fibroscan)    Narrative  Impression  Overall Impression:    Interpretation:  This was a technically adequate study. The Fibrosis score is consistent  with Metavir F2. The CAP score is consistent with 0 - 4% hepatocyte  steatosis (steatosis grade 0 of 3) .    Davion Rodriguez MD  Hepatology    Recommendation  Follow up with me    Indication  HCV antibody positive, Chronic hepatitis C virus genotype 1a infection  (Multi)    Preprocedure  A history and physical has been performed by me at a recent office visit.    Details of the Procedure  Refer to the scanned FibroScan report.    Findings  Results:  E (median liver stiffness measurement):   8.2   kPa  CAP (controlled attenuation parameter):  202  dB/m    Specimens  None     Reviewed the interactions with the following meds:  1) carbamazepine- patient has been holding the med since at least 2/27. He will allow for an 11 day washout period before starting the Epclusa. Based on the maximum half-life of 40 hours for extended release carbamazepine, this should be sufficient. Patient has been transitioned to gabapentin for management of the nuerologic pain   2) pantoprazole- patient to hold  3) famotidine- counseled patient on spacing out the med by 12 hours, ideally.  4) Tums- space out by 4 hours from the Epclusa    Medication start date: 3/10/25  Therapy completion: 6/2/25  Anticipated SVR date: 8/25/25    "

## 2025-04-02 DIAGNOSIS — B18.2 CHRONIC HEPATITIS C WITHOUT HEPATIC COMA (MULTI): ICD-10-CM

## 2025-04-07 DIAGNOSIS — B18.2 CHRONIC HEPATITIS C WITHOUT HEPATIC COMA (MULTI): ICD-10-CM

## 2025-04-10 ENCOUNTER — SPECIALTY PHARMACY (OUTPATIENT)
Dept: PHARMACY | Facility: HOSPITAL | Age: 55
End: 2025-04-10
Payer: COMMERCIAL

## 2025-04-10 NOTE — PROGRESS NOTES
The patient was called today to follow up mid-HCV treatment. He stated that he has been taking his medication daily with no issues. He denies having started any new medications. He was advised to complete the required labwork. He verbalized understanding.

## 2025-04-22 ENCOUNTER — APPOINTMENT (OUTPATIENT)
Dept: PRIMARY CARE | Facility: CLINIC | Age: 55
End: 2025-04-22
Payer: COMMERCIAL

## 2025-04-22 VITALS
BODY MASS INDEX: 35.93 KG/M2 | DIASTOLIC BLOOD PRESSURE: 86 MMHG | HEART RATE: 83 BPM | HEIGHT: 70 IN | SYSTOLIC BLOOD PRESSURE: 136 MMHG | TEMPERATURE: 97.2 F | OXYGEN SATURATION: 97 % | WEIGHT: 251 LBS | RESPIRATION RATE: 16 BRPM

## 2025-04-22 DIAGNOSIS — K21.9 GASTROESOPHAGEAL REFLUX DISEASE WITHOUT ESOPHAGITIS: ICD-10-CM

## 2025-04-22 DIAGNOSIS — B19.20 HEPATITIS C VIRUS INFECTION WITHOUT HEPATIC COMA, UNSPECIFIED CHRONICITY: ICD-10-CM

## 2025-04-22 DIAGNOSIS — Z00.00 ANNUAL PHYSICAL EXAM: ICD-10-CM

## 2025-04-22 DIAGNOSIS — N52.9 ERECTILE DYSFUNCTION, UNSPECIFIED ERECTILE DYSFUNCTION TYPE: ICD-10-CM

## 2025-04-22 DIAGNOSIS — G60.9 IDIOPATHIC PERIPHERAL NEUROPATHY: Primary | ICD-10-CM

## 2025-04-22 PROCEDURE — 3079F DIAST BP 80-89 MM HG: CPT | Performed by: FAMILY MEDICINE

## 2025-04-22 PROCEDURE — 3008F BODY MASS INDEX DOCD: CPT | Performed by: FAMILY MEDICINE

## 2025-04-22 PROCEDURE — 3075F SYST BP GE 130 - 139MM HG: CPT | Performed by: FAMILY MEDICINE

## 2025-04-22 PROCEDURE — 99213 OFFICE O/P EST LOW 20 MIN: CPT | Performed by: FAMILY MEDICINE

## 2025-04-22 NOTE — PROGRESS NOTES
"Subjective   Sly Patel is a 55 y.o. male who presents for Follow-up.  HPI    Started Tx for HCV in march     Changed from tegretol to sara d/t ampu neuropathy. Seems to be helping when he needs it.     Started pepcid in march for GERD. Drinks 3-4 pots coffee daily. Will last a few min then gone.   No cp w exer    Skip was inc at last visit       Medications Ordered Prior to Encounter[1]               Objective   /86   Pulse 83   Temp 36.2 °C (97.2 °F)   Resp 16   Ht 1.778 m (5' 10\")   Wt 114 kg (251 lb)   SpO2 97%   BMI 36.01 kg/m²    Physical Exam  General: NAD  HEENT:NCAT, PERRLA, nml OP  Neck: no cervical ARELI  Heart: RRR no murmur  Lungs: CTA b/l, no wheeze or rhonchi   MSK: no c/c LE prothesis   Skin: warm and dry  Psych: cooperative, appropriate affect  Neuro: speech clear. A&Ox3  Assessment/Plan   Problem List Items Addressed This Visit       Peripheral neuropathy - Primary  Stable w prn sara 300  Would like to cont off tegretol       Gastroesophageal reflux disease  Rec to take pepcid 40 BID consistently        Other Visit Diagnoses         Hepatitis C virus infection without hepatic coma, unspecified chronicity      On Tx for another 6 wk   Rec to get labs today         Erectile dysfunction, unspecified erectile dysfunction type      Improved w guadalupe    F/up 6 mo CPE Labs prior or prn                    [1]   Current Outpatient Medications on File Prior to Visit   Medication Sig Dispense Refill    amLODIPine (Norvasc) 10 mg tablet Take 1 tablet (10 mg) by mouth once daily. 90 tablet 3    aspirin 81 mg EC tablet Take 1 tablet (81 mg) by mouth once daily.      clindamycin (Cleocin T) 1 % lotion Apply topically if needed.      famotidine (Pepcid) 40 mg tablet Take 1 tablet (40 mg) by mouth 2 times a day. 60 tablet 11    gabapentin (Neurontin) 300 mg capsule Take 1 capsule (300 mg) by mouth once daily at bedtime. 30 capsule 11    olmesartan (BENIcar) 40 mg tablet Take 1 tablet (40 mg) " by mouth once daily. 30 tablet 11    sofosbuvir-velpatasvir (Epclusa) 400-100 mg tablet Take 1 tablet by mouth once daily. 90 tablet 0    tadalafil (Cialis) 20 mg tablet Take 1 tablet (20 mg) by mouth once daily as needed for erectile dysfunction. 10 tablet 5    [DISCONTINUED] carBAMazepine XR (TEGretol XR) 100 mg 12 hr tablet TAKE 1 TABLET(100 MG) BY MOUTH EVERY 12 HOURS. DO NOT CRUSH, CHEW, OR SPLIT (Patient not taking: Reported on 4/22/2025) 180 tablet 3     No current facility-administered medications on file prior to visit.

## 2025-04-23 LAB
HBV DNA SERPL NAA+PROBE-ACNC: NOT DETECTED IU/ML
HBV DNA SERPL NAA+PROBE-LOG IU: NOT DETECTED LOG IU/ML

## 2025-04-24 LAB
ALBUMIN SERPL-MCNC: 4.7 G/DL (ref 3.6–5.1)
ALBUMIN/GLOB SERPL: 2 (CALC) (ref 1–2.5)
ALP SERPL-CCNC: 54 U/L (ref 35–144)
ALT SERPL-CCNC: 14 U/L (ref 9–46)
AST SERPL-CCNC: 17 U/L (ref 10–35)
BILIRUB DIRECT SERPL-MCNC: 0.1 MG/DL
BILIRUB INDIRECT SERPL-MCNC: 0.3 MG/DL (CALC) (ref 0.2–1.2)
BILIRUB SERPL-MCNC: 0.4 MG/DL (ref 0.2–1.2)
GLOBULIN SER CALC-MCNC: 2.4 G/DL (CALC) (ref 1.9–3.7)
HCV RNA SERPL NAA+PROBE-ACNC: 25 IU/ML
HCV RNA SERPL NAA+PROBE-LOG IU: 1.4 LOG IU/ML
PROT SERPL-MCNC: 7.1 G/DL (ref 6.1–8.1)

## 2025-05-05 ENCOUNTER — APPOINTMENT (OUTPATIENT)
Dept: PRIMARY CARE | Facility: CLINIC | Age: 55
End: 2025-05-05
Payer: COMMERCIAL

## 2025-06-02 DIAGNOSIS — B18.2 CHRONIC HEPATITIS C WITHOUT HEPATIC COMA (MULTI): ICD-10-CM

## 2025-06-09 ENCOUNTER — SPECIALTY PHARMACY (OUTPATIENT)
Dept: PHARMACY | Facility: CLINIC | Age: 55
End: 2025-06-09

## 2025-06-09 NOTE — PROGRESS NOTES
This patient was called on 6/9/2025, to inquire if he had completed his HCV regimen. He stated that he did complete it without any side effects or missed doses. He was reminded to complete labs and follow up with the office soon after. He verbalized understanding. He didn't have any questions for the pharmacist.

## 2025-08-20 DIAGNOSIS — N52.9 ERECTILE DYSFUNCTION, UNSPECIFIED ERECTILE DYSFUNCTION TYPE: ICD-10-CM

## 2025-08-20 RX ORDER — TADALAFIL 20 MG/1
20 TABLET ORAL DAILY PRN
Qty: 10 TABLET | Refills: 5 | Status: SHIPPED | OUTPATIENT
Start: 2025-08-20 | End: 2026-08-20

## 2025-08-25 DIAGNOSIS — B18.2 CHRONIC HEPATITIS C WITHOUT HEPATIC COMA (MULTI): ICD-10-CM

## 2025-10-14 ENCOUNTER — APPOINTMENT (OUTPATIENT)
Dept: PRIMARY CARE | Facility: CLINIC | Age: 55
End: 2025-10-14
Payer: COMMERCIAL